# Patient Record
Sex: FEMALE | Race: WHITE | Employment: FULL TIME | URBAN - METROPOLITAN AREA
[De-identification: names, ages, dates, MRNs, and addresses within clinical notes are randomized per-mention and may not be internally consistent; named-entity substitution may affect disease eponyms.]

---

## 2017-07-03 ENCOUNTER — OFFICE VISIT (OUTPATIENT)
Dept: INTERNAL MEDICINE | Age: 27
End: 2017-07-03

## 2017-07-03 VITALS
BODY MASS INDEX: 43.5 KG/M2 | DIASTOLIC BLOOD PRESSURE: 76 MMHG | HEART RATE: 77 BPM | TEMPERATURE: 98.1 F | WEIGHT: 254.8 LBS | RESPIRATION RATE: 16 BRPM | SYSTOLIC BLOOD PRESSURE: 110 MMHG | OXYGEN SATURATION: 98 % | HEIGHT: 64 IN

## 2017-07-03 DIAGNOSIS — E03.9 ACQUIRED HYPOTHYROIDISM: ICD-10-CM

## 2017-07-03 DIAGNOSIS — Z11.4 SCREENING FOR HIV (HUMAN IMMUNODEFICIENCY VIRUS): ICD-10-CM

## 2017-07-03 DIAGNOSIS — E27.1 ADRENAL INSUFFICIENCY (ADDISON'S DISEASE) (HCC): ICD-10-CM

## 2017-07-03 DIAGNOSIS — Z00.00 ROUTINE PHYSICAL EXAMINATION: Primary | ICD-10-CM

## 2017-07-03 DIAGNOSIS — L30.9 DERMATITIS: ICD-10-CM

## 2017-07-03 DIAGNOSIS — Z23 NEED FOR TDAP VACCINATION: ICD-10-CM

## 2017-07-03 LAB
ALBUMIN SERPL-MCNC: 3.8 G/DL (ref 3.9–4.9)
ALP BLD-CCNC: 62 U/L (ref 40–130)
ALT SERPL-CCNC: 28 U/L (ref 0–33)
ANION GAP SERPL CALCULATED.3IONS-SCNC: 17 MEQ/L (ref 7–13)
AST SERPL-CCNC: 19 U/L (ref 0–35)
BILIRUB SERPL-MCNC: 0.3 MG/DL (ref 0–1.2)
BUN BLDV-MCNC: 12 MG/DL (ref 6–20)
CALCIUM SERPL-MCNC: 8.8 MG/DL (ref 8.6–10.2)
CHLORIDE BLD-SCNC: 103 MEQ/L (ref 98–107)
CHOLESTEROL, TOTAL: 178 MG/DL (ref 0–199)
CO2: 18 MEQ/L (ref 22–29)
CORTISOL - AM: 6.4 UG/DL (ref 6.2–19.4)
CREAT SERPL-MCNC: 0.44 MG/DL (ref 0.5–0.9)
GFR AFRICAN AMERICAN: >60
GFR NON-AFRICAN AMERICAN: >60
GLOBULIN: 3.2 G/DL (ref 2.3–3.5)
GLUCOSE BLD-MCNC: 87 MG/DL (ref 74–109)
HCT VFR BLD CALC: 41.7 % (ref 37–47)
HDLC SERPL-MCNC: 33 MG/DL (ref 40–59)
HEMOGLOBIN: 13.1 G/DL (ref 12–16)
LDL CHOLESTEROL CALCULATED: 110 MG/DL (ref 0–129)
MCH RBC QN AUTO: 26.5 PG (ref 27–31.3)
MCHC RBC AUTO-ENTMCNC: 31.5 % (ref 33–37)
MCV RBC AUTO: 83.9 FL (ref 82–100)
PDW BLD-RTO: 14.1 % (ref 11.5–14.5)
PLATELET # BLD: 328 K/UL (ref 130–400)
POTASSIUM SERPL-SCNC: 4.1 MEQ/L (ref 3.5–5.1)
RBC # BLD: 4.96 M/UL (ref 4.2–5.4)
SODIUM BLD-SCNC: 138 MEQ/L (ref 132–144)
TOTAL PROTEIN: 7 G/DL (ref 6.4–8.1)
TRIGL SERPL-MCNC: 173 MG/DL (ref 0–200)
TSH SERPL DL<=0.05 MIU/L-ACNC: 4.82 UIU/ML (ref 0.27–4.2)
WBC # BLD: 12.3 K/UL (ref 4.8–10.8)

## 2017-07-03 PROCEDURE — 90715 TDAP VACCINE 7 YRS/> IM: CPT | Performed by: PHYSICIAN ASSISTANT

## 2017-07-03 PROCEDURE — 96160 PT-FOCUSED HLTH RISK ASSMT: CPT | Performed by: PHYSICIAN ASSISTANT

## 2017-07-03 PROCEDURE — 36415 COLL VENOUS BLD VENIPUNCTURE: CPT | Performed by: PHYSICIAN ASSISTANT

## 2017-07-03 PROCEDURE — 90471 IMMUNIZATION ADMIN: CPT | Performed by: PHYSICIAN ASSISTANT

## 2017-07-03 PROCEDURE — 99203 OFFICE O/P NEW LOW 30 MIN: CPT | Performed by: PHYSICIAN ASSISTANT

## 2017-07-03 RX ORDER — ESCITALOPRAM OXALATE 20 MG/1
1 TABLET ORAL DAILY
Refills: 1 | COMMUNITY
Start: 2017-06-10 | End: 2018-01-24

## 2017-07-03 RX ORDER — QUETIAPINE FUMARATE 100 MG/1
1 TABLET, FILM COATED ORAL NIGHTLY
Refills: 1 | COMMUNITY
Start: 2017-06-10 | End: 2018-01-24

## 2017-07-03 RX ORDER — HYDROXYZINE PAMOATE 25 MG/1
1 CAPSULE ORAL 2 TIMES DAILY
Refills: 1 | COMMUNITY
Start: 2017-05-08 | End: 2020-06-04

## 2017-07-03 ASSESSMENT — ENCOUNTER SYMPTOMS
VOMITING: 0
EYE PAIN: 0
CONSTIPATION: 0
NAUSEA: 1
HEARTBURN: 0
SORE THROAT: 0
COUGH: 0
DIARRHEA: 1
WHEEZING: 0
BLURRED VISION: 0
ABDOMINAL PAIN: 0
BACK PAIN: 0
SHORTNESS OF BREATH: 0

## 2017-07-03 ASSESSMENT — PATIENT HEALTH QUESTIONNAIRE - PHQ9
4. FEELING TIRED OR HAVING LITTLE ENERGY: 3
8. MOVING OR SPEAKING SO SLOWLY THAT OTHER PEOPLE COULD HAVE NOTICED. OR THE OPPOSITE, BEING SO FIGETY OR RESTLESS THAT YOU HAVE BEEN MOVING AROUND A LOT MORE THAN USUAL: 3
SUM OF ALL RESPONSES TO PHQ QUESTIONS 1-9: 22
2. FEELING DOWN, DEPRESSED OR HOPELESS: 3
7. TROUBLE CONCENTRATING ON THINGS, SUCH AS READING THE NEWSPAPER OR WATCHING TELEVISION: 0
9. THOUGHTS THAT YOU WOULD BE BETTER OFF DEAD, OR OF HURTING YOURSELF: 1
5. POOR APPETITE OR OVEREATING: 3
6. FEELING BAD ABOUT YOURSELF - OR THAT YOU ARE A FAILURE OR HAVE LET YOURSELF OR YOUR FAMILY DOWN: 3
10. IF YOU CHECKED OFF ANY PROBLEMS, HOW DIFFICULT HAVE THESE PROBLEMS MADE IT FOR YOU TO DO YOUR WORK, TAKE CARE OF THINGS AT HOME, OR GET ALONG WITH OTHER PEOPLE: 1
1. LITTLE INTEREST OR PLEASURE IN DOING THINGS: 3
3. TROUBLE FALLING OR STAYING ASLEEP: 3
SUM OF ALL RESPONSES TO PHQ9 QUESTIONS 1 & 2: 6

## 2017-07-06 LAB — HIV-1 AND HIV-2 ANTIBODIES: NEGATIVE

## 2017-07-20 ENCOUNTER — TELEPHONE (OUTPATIENT)
Dept: INTERNAL MEDICINE | Age: 27
End: 2017-07-20

## 2017-07-20 DIAGNOSIS — D72.829 LEUKOCYTOSIS, UNSPECIFIED TYPE: ICD-10-CM

## 2017-07-20 DIAGNOSIS — R79.89 ABNORMAL TSH: Primary | ICD-10-CM

## 2017-08-02 DIAGNOSIS — L30.9 DERMATITIS: Primary | ICD-10-CM

## 2017-08-08 ENCOUNTER — NURSE ONLY (OUTPATIENT)
Dept: INTERNAL MEDICINE | Age: 27
End: 2017-08-08

## 2017-08-08 DIAGNOSIS — R79.89 ABNORMAL TSH: ICD-10-CM

## 2017-08-08 DIAGNOSIS — D72.829 LEUKOCYTOSIS, UNSPECIFIED TYPE: ICD-10-CM

## 2017-08-08 DIAGNOSIS — L30.9 DERMATITIS: Primary | ICD-10-CM

## 2017-08-08 LAB
HCT VFR BLD CALC: 39.8 % (ref 37–47)
HEMOGLOBIN: 12.9 G/DL (ref 12–16)
MCH RBC QN AUTO: 26.6 PG (ref 27–31.3)
MCHC RBC AUTO-ENTMCNC: 32.5 % (ref 33–37)
MCV RBC AUTO: 82 FL (ref 82–100)
PDW BLD-RTO: 13.8 % (ref 11.5–14.5)
PLATELET # BLD: 359 K/UL (ref 130–400)
RBC # BLD: 4.85 M/UL (ref 4.2–5.4)
TSH REFLEX: 7.58 UIU/ML (ref 0.27–4.2)
WBC # BLD: 13.9 K/UL (ref 4.8–10.8)

## 2017-08-08 PROCEDURE — 36415 COLL VENOUS BLD VENIPUNCTURE: CPT | Performed by: PHYSICIAN ASSISTANT

## 2017-08-09 DIAGNOSIS — E03.9 ACQUIRED HYPOTHYROIDISM: Primary | ICD-10-CM

## 2017-08-09 LAB — T4 FREE: 1.06 NG/DL (ref 0.93–1.7)

## 2017-08-09 RX ORDER — LEVOTHYROXINE SODIUM 0.03 MG/1
25 TABLET ORAL DAILY
Qty: 30 TABLET | Refills: 3 | Status: SHIPPED | OUTPATIENT
Start: 2017-08-09 | End: 2018-01-24

## 2017-08-13 LAB
2000687N OAK TREE IGE: <0.1 KU/L
ALLERGEN ASPERGILLUS ALTERNATA IGE: <0.1 KU/L
ALLERGEN ASPERGILLUS FUMIGATUS IGE: <0.1 KU/L
ALLERGEN BERMUDA GRASS IGE: <0.1 KU/L
ALLERGEN BIRCH IGE: <0.1 KU/L
ALLERGEN CAT DANDER IGE: <0.1 KU/L
ALLERGEN CLAMS IGE: <0.1 KU/L
ALLERGEN CODFISH IGE: <0.1 KU/L
ALLERGEN COMMON SHORT RAGWEED IGE: <0.1 KU/L
ALLERGEN CORN IGE: <0.1 KU/L
ALLERGEN COTTONWOOD: <0.1 KU/L
ALLERGEN COW MILK IGE: <0.1 KU/L
ALLERGEN DOG DANDER IGE: <0.1 KU/L
ALLERGEN EGG WHITE IGE: <0.1 KU/L
ALLERGEN ELM IGE: <0.1 KU/L
ALLERGEN FUNGI/MOLD M.RACEMOSUS IGE: <0.1 KU/L
ALLERGEN GERMAN COCKROACH IGE: 0.39 KU/L
ALLERGEN HORMODENDRUM HORDEI IGE: <0.1 KU/L
ALLERGEN MAPLE/BOX ELDER IGE: <0.1 KU/L
ALLERGEN MITE DUST FARINAE IGE: <0.1 KU/L
ALLERGEN MITE DUST PTERONYSSINUS IGE: <0.1 KU/L
ALLERGEN MOUNTAIN CEDAR: <0.1 KU/L
ALLERGEN MOUSE EPITHELIA IGE: <0.1 KU/L
ALLERGEN PEANUT (F13) IGE: <0.1 KU/L
ALLERGEN PECAN TREE IGE: <0.1 KU/L
ALLERGEN PENICILLIUM NOTATUM: <0.1 KU/L
ALLERGEN ROUGH PIGWEED (W14) IGE: <0.1 KU/L
ALLERGEN RUSSIAN THISTLE IGE: <0.1 KU/L
ALLERGEN SCALLOP IGE: <0.1 KU/L
ALLERGEN SEE NOTE: NORMAL
ALLERGEN SHEEP SORREL (W18) IGE: <0.1 KU/L
ALLERGEN SHRIMP IGE: 0.2 KU/L
ALLERGEN SOYBEAN IGE: <0.1 KU/L
ALLERGEN TIMOTHY GRASS: <0.1 KU/L
ALLERGEN TREE SYCAMORE: <0.1 KU/L
ALLERGEN WALNUT IGE: <0.1 KU/L
ALLERGEN WALNUT TREE IGE: <0.1 KU/L
ALLERGEN WHEAT IGE: <0.1 KU/L
ALLERGEN WHITE MULBERRY TREE, IGE: <0.1 KU/L
ALLERGEN, TREE, WHITE ASH IGE: <0.1 KU/L
IGE: 23 KU/L

## 2017-11-08 ENCOUNTER — NURSE ONLY (OUTPATIENT)
Dept: INTERNAL MEDICINE | Age: 27
End: 2017-11-08

## 2017-11-08 DIAGNOSIS — E03.9 ACQUIRED HYPOTHYROIDISM: Primary | ICD-10-CM

## 2017-11-08 LAB — TSH SERPL DL<=0.05 MIU/L-ACNC: 7.15 UIU/ML (ref 0.27–4.2)

## 2017-11-10 DIAGNOSIS — E03.9 ACQUIRED HYPOTHYROIDISM: Primary | ICD-10-CM

## 2017-11-10 RX ORDER — LEVOTHYROXINE SODIUM 0.05 MG/1
50 TABLET ORAL DAILY
Qty: 30 TABLET | Refills: 3 | Status: SHIPPED | OUTPATIENT
Start: 2017-11-10 | End: 2018-09-08 | Stop reason: SDUPTHER

## 2018-01-24 ENCOUNTER — OFFICE VISIT (OUTPATIENT)
Dept: INTERNAL MEDICINE CLINIC | Age: 28
End: 2018-01-24
Payer: COMMERCIAL

## 2018-01-24 VITALS
HEIGHT: 64 IN | HEART RATE: 83 BPM | OXYGEN SATURATION: 97 % | DIASTOLIC BLOOD PRESSURE: 80 MMHG | TEMPERATURE: 98.6 F | WEIGHT: 277.2 LBS | SYSTOLIC BLOOD PRESSURE: 130 MMHG | BODY MASS INDEX: 47.33 KG/M2

## 2018-01-24 DIAGNOSIS — F41.8 ANXIETY WITH DEPRESSION: ICD-10-CM

## 2018-01-24 DIAGNOSIS — J01.00 ACUTE NON-RECURRENT MAXILLARY SINUSITIS: Primary | ICD-10-CM

## 2018-01-24 DIAGNOSIS — G47.00 INSOMNIA, UNSPECIFIED TYPE: ICD-10-CM

## 2018-01-24 PROCEDURE — G8427 DOCREV CUR MEDS BY ELIG CLIN: HCPCS | Performed by: PHYSICIAN ASSISTANT

## 2018-01-24 PROCEDURE — G8484 FLU IMMUNIZE NO ADMIN: HCPCS | Performed by: PHYSICIAN ASSISTANT

## 2018-01-24 PROCEDURE — G8417 CALC BMI ABV UP PARAM F/U: HCPCS | Performed by: PHYSICIAN ASSISTANT

## 2018-01-24 PROCEDURE — 1036F TOBACCO NON-USER: CPT | Performed by: PHYSICIAN ASSISTANT

## 2018-01-24 PROCEDURE — 99213 OFFICE O/P EST LOW 20 MIN: CPT | Performed by: PHYSICIAN ASSISTANT

## 2018-01-24 RX ORDER — BROMPHENIRAMINE MALEATE, PSEUDOEPHEDRINE HYDROCHLORIDE, AND DEXTROMETHORPHAN HYDROBROMIDE 2; 30; 10 MG/5ML; MG/5ML; MG/5ML
SYRUP ORAL
Refills: 0 | COMMUNITY
Start: 2018-01-21 | End: 2018-05-31 | Stop reason: ALTCHOICE

## 2018-01-24 RX ORDER — TRAZODONE HYDROCHLORIDE 150 MG/1
150 TABLET ORAL NIGHTLY
Qty: 30 TABLET | Refills: 3 | Status: SHIPPED | OUTPATIENT
Start: 2018-01-24 | End: 2020-06-04

## 2018-01-24 RX ORDER — HYDROXYZINE PAMOATE 25 MG/1
25 CAPSULE ORAL 3 TIMES DAILY PRN
Qty: 90 CAPSULE | Refills: 3 | Status: SHIPPED | OUTPATIENT
Start: 2018-01-24 | End: 2018-02-23

## 2018-01-24 RX ORDER — ESCITALOPRAM OXALATE 20 MG/1
TABLET ORAL
Qty: 45 TABLET | Refills: 3 | Status: SHIPPED | OUTPATIENT
Start: 2018-01-24 | End: 2020-06-04

## 2018-01-24 RX ORDER — CEFDINIR 300 MG/1
CAPSULE ORAL
Refills: 0 | COMMUNITY
Start: 2018-01-21 | End: 2018-05-31 | Stop reason: ALTCHOICE

## 2018-01-24 RX ORDER — FLUTICASONE PROPIONATE 50 MCG
1 SPRAY, SUSPENSION (ML) NASAL DAILY
Qty: 1 BOTTLE | Refills: 3 | Status: SHIPPED | OUTPATIENT
Start: 2018-01-24 | End: 2018-05-31

## 2018-01-24 ASSESSMENT — ENCOUNTER SYMPTOMS
DIARRHEA: 0
SORE THROAT: 0
COUGH: 1
DOUBLE VISION: 0
SINUS PAIN: 0
SHORTNESS OF BREATH: 1
VOMITING: 0
ABDOMINAL PAIN: 0
BLURRED VISION: 0
BACK PAIN: 0
WHEEZING: 0

## 2018-01-24 NOTE — PROGRESS NOTES
Subjective  Char Ashley, 28 y.o. female presents today with:  Chief Complaint   Patient presents with    Medication Check     pt is here to discuss medication for anxiety and depression. pt states neither are working well and [facility] ended her treatment on them     URI     pt was in an urgent care  and was told she had bronchitis. medications she was put on not helping. pt is also on antibiotic. HPI      Review of Systems   Constitutional: Negative for chills, fever and weight loss. HENT: Negative for congestion, sinus pain and sore throat. Eyes: Negative for blurred vision and double vision. Respiratory: Positive for cough and shortness of breath. Negative for wheezing. Cardiovascular: Positive for palpitations. Negative for chest pain. Gastrointestinal: Negative for abdominal pain, diarrhea and vomiting. Genitourinary: Negative for hematuria. Musculoskeletal: Positive for neck pain. Negative for back pain. Skin: Negative for itching and rash. Neurological: Positive for dizziness and headaches. Psychiatric/Behavioral: Negative for memory loss. The patient does not have insomnia. Past Medical History:   Diagnosis Date    Adrenal insufficiency (Winn's disease) ([facility] [address]     Anti-phospholipid antibody syndrome ([facility]     Anxiety 2017    Depression 2017    Hypothyroidism      Past Surgical History:   Procedure Laterality Date     SECTION      GALLBLADDER SURGERY Bilateral 2015    OVARY REMOVAL Left      Social History     Social History    Marital status:      Spouse name: N/A    Number of children: N/A    Years of education: N/A     Occupational History    Not on file.      Social History Main Topics    Smoking status: Former Smoker     Types: Cigarettes     Quit date: 7/3/2015    Smokeless tobacco: Never Used    Alcohol use No    Drug use: No    Sexual activity: Yes     Partners: Male, Female     Other Topics Concern    Not on maxillary sinus   Eyes: Conjunctivae and EOM are normal. Pupils are equal, round, and reactive to light. Neck: Normal range of motion. Neck supple. Cardiovascular: Normal rate, regular rhythm and normal heart sounds. Pulmonary/Chest: Effort normal and breath sounds normal.   Abdominal: Soft. Bowel sounds are normal.   Musculoskeletal: Normal range of motion. Neurological: She is alert and oriented to person, place, and time. Skin: Skin is warm and dry. Psychiatric: Affect normal.            Assessment & Plan   1. Acute non-recurrent maxillary sinusitis     2. Anxiety with depression  Amb External Referral To Psychiatry   3. Insomnia, unspecified type  Amb External Referral To Psychiatry         Orders Placed This Encounter   Procedures    Amb External Referral To Psychiatry     Referral Priority:   Routine     Referral Type:   Consult for Advice and Opinion     Requested Specialty:   Behavioral Health     Number of Visits Requested:   1     Orders Placed This Encounter   Medications    escitalopram (LEXAPRO) 20 MG tablet     Sig: Take 1 1/2 tablets daily by  mouth     Dispense:  45 tablet     Refill:  3    hydrOXYzine (VISTARIL) 25 MG capsule     Sig: Take 1 capsule by mouth 3 times daily as needed for Anxiety     Dispense:  90 capsule     Refill:  3    traZODone (DESYREL) 150 MG tablet     Sig: Take 1 tablet by mouth nightly     Dispense:  30 tablet     Refill:  3    fluticasone (FLONASE) 50 MCG/ACT nasal spray     Si spray by Nasal route daily     Dispense:  1 Bottle     Refill:  3     Medications Discontinued During This Encounter   Medication Reason    escitalopram (LEXAPRO) 20 MG tablet     hydrocortisone 2.5 % cream     levothyroxine (SYNTHROID) 25 MCG tablet     QUEtiapine (SEROQUEL) 100 MG tablet      Return in about 3 months (around 2018).     Lyudmila Andrews PA-C

## 2018-05-29 ENCOUNTER — TELEPHONE (OUTPATIENT)
Dept: INTERNAL MEDICINE CLINIC | Age: 28
End: 2018-05-29

## 2018-05-31 ENCOUNTER — OFFICE VISIT (OUTPATIENT)
Dept: INTERNAL MEDICINE CLINIC | Age: 28
End: 2018-05-31
Payer: COMMERCIAL

## 2018-05-31 VITALS
DIASTOLIC BLOOD PRESSURE: 70 MMHG | HEIGHT: 64 IN | RESPIRATION RATE: 16 BRPM | SYSTOLIC BLOOD PRESSURE: 108 MMHG | TEMPERATURE: 98.6 F | HEART RATE: 74 BPM | WEIGHT: 277.4 LBS | BODY MASS INDEX: 47.36 KG/M2 | OXYGEN SATURATION: 98 %

## 2018-05-31 DIAGNOSIS — F41.8 ANXIETY WITH DEPRESSION: Primary | ICD-10-CM

## 2018-05-31 PROCEDURE — 99214 OFFICE O/P EST MOD 30 MIN: CPT | Performed by: PHYSICIAN ASSISTANT

## 2018-05-31 RX ORDER — SERTRALINE HYDROCHLORIDE 25 MG/1
25 TABLET, FILM COATED ORAL DAILY
Qty: 30 TABLET | Refills: 3 | Status: SHIPPED | OUTPATIENT
Start: 2018-05-31 | End: 2020-06-04

## 2018-05-31 RX ORDER — MECLIZINE HCL 12.5 MG/1
12.5 TABLET ORAL 3 TIMES DAILY PRN
COMMUNITY
End: 2020-06-04

## 2018-05-31 ASSESSMENT — ENCOUNTER SYMPTOMS
WHEEZING: 0
CONSTIPATION: 0
HEARTBURN: 0
DIARRHEA: 0
COUGH: 1
SHORTNESS OF BREATH: 0
ABDOMINAL PAIN: 0
NAUSEA: 0
SORE THROAT: 0
BLURRED VISION: 0
BACK PAIN: 0
VOMITING: 0

## 2018-09-10 DIAGNOSIS — E03.9 ACQUIRED HYPOTHYROIDISM: Primary | ICD-10-CM

## 2020-03-20 ENCOUNTER — VIRTUAL VISIT (OUTPATIENT)
Dept: FAMILY MEDICINE CLINIC | Age: 30
End: 2020-03-20
Payer: COMMERCIAL

## 2020-03-20 PROCEDURE — 99442 PR PHYS/QHP TELEPHONE EVALUATION 11-20 MIN: CPT | Performed by: PHYSICIAN ASSISTANT

## 2020-03-20 RX ORDER — SERTRALINE HYDROCHLORIDE 25 MG/1
25 TABLET, FILM COATED ORAL DAILY
Qty: 90 TABLET | Refills: 1 | Status: SHIPPED | OUTPATIENT
Start: 2020-03-20 | End: 2020-08-31

## 2020-03-20 RX ORDER — OMEPRAZOLE 20 MG/1
20 CAPSULE, DELAYED RELEASE ORAL
Qty: 60 CAPSULE | Refills: 5 | Status: SHIPPED | OUTPATIENT
Start: 2020-03-20 | End: 2020-04-23 | Stop reason: SDUPTHER

## 2020-03-20 ASSESSMENT — ENCOUNTER SYMPTOMS: ABDOMINAL PAIN: 1

## 2020-03-20 NOTE — PROGRESS NOTES
3/20/2020     Mabel العراقي (:  1990) is a 26 y.o. female, here for evaluation of the following medical concerns:    HPI  Telephone encounter for complaint of anxiety depression heartburn and hypothyroidism  Patient has not not been seen in office in 2 years due to loss of insurance  States she is really suffering greatly with her anxiety particularly in social situations her depression has been \"up and down' denies suicidal ideation however in 2018 she attempted suicide by taking an overdose of sleeping pills  She has been receiving psychiatric care through the [facility] but no one recently  On zoloft most recently but does not recall her response  Complaining of heartburn indigestion particularly with caffeine and spicy foods symptoms worsened at bedtime has remote history of peptic ulcer  Has been off of synthroid since   Review of Systems   Gastrointestinal: Positive for abdominal pain. Heartburn   Psychiatric/Behavioral: Positive for dysphoric mood. The patient is nervous/anxious. All other systems reviewed and are negative. Prior to Visit Medications    Medication Sig Taking?  Authorizing Provider   levothyroxine (SYNTHROID) 50 MCG tablet TAKE ONE TABLET BY MOUTH EVERY DAY  Lyudmila Andrews PA-C   etonogestrel (NEXPLANON) 68 MG implant Inject 68 mg into the skin  Historical Provider, MD   meclizine (ANTIVERT) 12.5 MG tablet Take 12.5 mg by mouth 3 times daily as needed  Historical Provider, MD   sertraline (ZOLOFT) 25 MG tablet Take 1 tablet by mouth daily  Lyudmila Andrews PA-C   VENTOLIN  (90 Base) MCG/ACT inhaler INHALE 2 PUFFS EVERY 4-6 HOURS AS NEEDED  Historical Provider, MD   escitalopram (LEXAPRO) 20 MG tablet Take 1 1/2 tablets daily by  mouth  Lyudmila Andrews PA-C   traZODone (DESYREL) 150 MG tablet Take 1 tablet by mouth nightly  Lyudmila Andrews PA-C   hydrOXYzine (VISTARIL) 25 MG capsule Take 1 capsule by mouth 2 times daily Historical Provider, MD        Social History     Tobacco Use    Smoking status: Former Smoker     Types: Cigarettes     Last attempt to quit: 7/3/2015     Years since quittin.7    Smokeless tobacco: Never Used   Substance Use Topics    Alcohol use: No        There were no vitals filed for this visit. Estimated body mass index is 47.62 kg/m² as calculated from the following:    Height as of 18: 5' 4\" (1.626 m). Weight as of 18: 277 lb 6.4 oz (125.8 kg). Physical Exam  Neurological:      Mental Status: She is oriented to person, place, and time. Assessment & Plan    Diagnosis Orders   1. Anxiety with depression  Vitamin D 25 Hydroxy    sertraline (ZOLOFT) 25 MG tablet   2. Gastroesophageal reflux disease, esophagitis presence not specified  omeprazole (PRILOSEC) 20 MG delayed release capsule   3. Acquired hypothyroidism  TSH with Reflex   4. Routine medical exam  Comprehensive Metabolic Panel    CBC With Auto Differential         Orders Placed This Encounter   Procedures    Comprehensive Metabolic Panel     Standing Status:   Future     Standing Expiration Date:   3/20/2021    CBC With Auto Differential     Standing Status:   Future     Standing Expiration Date:   3/20/2021    TSH with Reflex     Standing Status:   Future     Standing Expiration Date:   3/20/2021    Vitamin D 25 Hydroxy     Standing Status:   Future     Standing Expiration Date:   3/20/2021     Orders Placed This Encounter   Medications    omeprazole (PRILOSEC) 20 MG delayed release capsule     Sig: Take 1 capsule by mouth 2 times daily (before meals)     Dispense:  60 capsule     Refill:  5    sertraline (ZOLOFT) 25 MG tablet     Sig: Take 1 tablet by mouth daily     Dispense:  90 tablet     Refill:  1     There are no discontinued medications. No follow-ups on file. An electronic signature was used to authenticate this note.     --Lyudmila Andrews PA-C on 3/20/2020 at 8:54 AM

## 2020-03-21 DIAGNOSIS — F41.8 ANXIETY WITH DEPRESSION: ICD-10-CM

## 2020-03-21 DIAGNOSIS — Z00.00 ROUTINE MEDICAL EXAM: ICD-10-CM

## 2020-03-21 DIAGNOSIS — E03.9 ACQUIRED HYPOTHYROIDISM: ICD-10-CM

## 2020-03-21 LAB
ALBUMIN SERPL-MCNC: 4.3 G/DL (ref 3.5–4.6)
ALP BLD-CCNC: 72 U/L (ref 40–130)
ALT SERPL-CCNC: 34 U/L (ref 0–33)
ANION GAP SERPL CALCULATED.3IONS-SCNC: 16 MEQ/L (ref 9–15)
AST SERPL-CCNC: 21 U/L (ref 0–35)
BASOPHILS ABSOLUTE: 0.1 K/UL (ref 0–0.2)
BASOPHILS RELATIVE PERCENT: 0.9 %
BILIRUB SERPL-MCNC: 0.5 MG/DL (ref 0.2–0.7)
BUN BLDV-MCNC: 10 MG/DL (ref 6–20)
CALCIUM SERPL-MCNC: 9.5 MG/DL (ref 8.5–9.9)
CHLORIDE BLD-SCNC: 98 MEQ/L (ref 95–107)
CO2: 25 MEQ/L (ref 20–31)
CREAT SERPL-MCNC: 0.64 MG/DL (ref 0.5–0.9)
EOSINOPHILS ABSOLUTE: 0.2 K/UL (ref 0–0.7)
EOSINOPHILS RELATIVE PERCENT: 1.7 %
GFR AFRICAN AMERICAN: >60
GFR NON-AFRICAN AMERICAN: >60
GLOBULIN: 4 G/DL (ref 2.3–3.5)
GLUCOSE BLD-MCNC: 85 MG/DL (ref 70–99)
HCT VFR BLD CALC: 46.7 % (ref 37–47)
HEMOGLOBIN: 15 G/DL (ref 12–16)
LYMPHOCYTES ABSOLUTE: 3.3 K/UL (ref 1–4.8)
LYMPHOCYTES RELATIVE PERCENT: 23.4 %
MCH RBC QN AUTO: 27.8 PG (ref 27–31.3)
MCHC RBC AUTO-ENTMCNC: 32.2 % (ref 33–37)
MCV RBC AUTO: 86.3 FL (ref 82–100)
MONOCYTES ABSOLUTE: 0.7 K/UL (ref 0.2–0.8)
MONOCYTES RELATIVE PERCENT: 4.9 %
NEUTROPHILS ABSOLUTE: 9.7 K/UL (ref 1.4–6.5)
NEUTROPHILS RELATIVE PERCENT: 69.1 %
PDW BLD-RTO: 13.4 % (ref 11.5–14.5)
PLATELET # BLD: 425 K/UL (ref 130–400)
POTASSIUM SERPL-SCNC: 4.2 MEQ/L (ref 3.4–4.9)
RBC # BLD: 5.4 M/UL (ref 4.2–5.4)
SODIUM BLD-SCNC: 139 MEQ/L (ref 135–144)
TOTAL PROTEIN: 8.3 G/DL (ref 6.3–8)
TSH REFLEX: 3.45 UIU/ML (ref 0.44–3.86)
VITAMIN D 25-HYDROXY: 18.4 NG/ML (ref 30–100)
WBC # BLD: 14.1 K/UL (ref 4.8–10.8)

## 2020-03-23 RX ORDER — METHOCARBAMOL 750 MG/1
50000 TABLET ORAL WEEKLY
Qty: 4 CAPSULE | Refills: 3 | Status: SHIPPED | OUTPATIENT
Start: 2020-03-23 | End: 2020-04-23 | Stop reason: SDUPTHER

## 2020-04-01 ENCOUNTER — PATIENT MESSAGE (OUTPATIENT)
Dept: FAMILY MEDICINE CLINIC | Age: 30
End: 2020-04-01

## 2020-04-01 RX ORDER — CITALOPRAM 20 MG/1
20 TABLET ORAL DAILY
Qty: 30 TABLET | Refills: 5 | Status: SHIPPED | OUTPATIENT
Start: 2020-04-01 | End: 2020-04-23 | Stop reason: SDUPTHER

## 2020-04-01 NOTE — TELEPHONE ENCOUNTER
From: Claudell Grade  To: Lyudmila Andrews PA-C  Sent: 4/1/2020 11:30 AM EDT  Subject: Prescription Question    I was wondering if it was possible to switch from the zoloft to another medication? Since starting it I have started experiencing some fairly severe nausea which has resulted in vomiting a few times. I have no fever, or any cold/flu symptoms other than a stuffy nose and headache so I think its medication related.

## 2020-04-01 NOTE — TELEPHONE ENCOUNTER
rx changed to celexa which she can start tomorrow if she has taken the zoloft today. let me know how she does

## 2020-04-21 ENCOUNTER — PATIENT MESSAGE (OUTPATIENT)
Dept: FAMILY MEDICINE CLINIC | Age: 30
End: 2020-04-21

## 2020-04-23 RX ORDER — CITALOPRAM 20 MG/1
20 TABLET ORAL DAILY
Qty: 90 TABLET | Refills: 1 | Status: SHIPPED | OUTPATIENT
Start: 2020-04-23 | End: 2020-06-05 | Stop reason: ALTCHOICE

## 2020-04-23 RX ORDER — OMEPRAZOLE 20 MG/1
20 CAPSULE, DELAYED RELEASE ORAL
Qty: 180 CAPSULE | Refills: 1 | Status: SHIPPED | OUTPATIENT
Start: 2020-04-23 | End: 2020-08-31

## 2020-04-23 RX ORDER — METHOCARBAMOL 750 MG/1
50000 TABLET ORAL WEEKLY
Qty: 12 CAPSULE | Refills: 0 | Status: SHIPPED | OUTPATIENT
Start: 2020-04-23 | End: 2020-09-24

## 2020-04-23 NOTE — TELEPHONE ENCOUNTER
From: Thania Romero  Sent: 4/23/2020 2:04 PM EDT  To: Nakia Diaz  Clinical Staff  Subject: RE: Prescription Question    Hello I take  Vitamin D 50,000 units once a week  Celexa 20 mg once a day  Omeprazole DR 20 mg twice a day  [facility] on [address]     ----- Message -----  From: Sandra Jace  Sent: 4/22/20 11:54 AM  To: Thania Romero  Subject: RE: Prescription Question    She would like you to please send exactly what you need including dosing and directions also pharmacy you need them to go to.      ----- Message -----   From:Suad Willson   Sent:4/21/2020 6:09 PM EDT   To:Lyudmila Andrews PA-C   Subject:Prescription Question    Hello, I have received notification from my insurance provider that my maintenance medications have to be a 90 day supply to be covered. Is it possible to get a 90 day prescription for my maintenance medications?       - - - DISCLAIMER - - -  [email or web address][This message may contain formatting that cannot be shown on this site.]

## 2020-04-30 ENCOUNTER — VIRTUAL VISIT (OUTPATIENT)
Dept: FAMILY MEDICINE CLINIC | Age: 30
End: 2020-04-30
Payer: COMMERCIAL

## 2020-04-30 PROCEDURE — G0444 DEPRESSION SCREEN ANNUAL: HCPCS | Performed by: PHYSICIAN ASSISTANT

## 2020-04-30 PROCEDURE — 99442 PR PHYS/QHP TELEPHONE EVALUATION 11-20 MIN: CPT | Performed by: PHYSICIAN ASSISTANT

## 2020-04-30 RX ORDER — CITALOPRAM 40 MG/1
40 TABLET ORAL DAILY
Qty: 30 TABLET | Refills: 5 | Status: SHIPPED | OUTPATIENT
Start: 2020-04-30 | End: 2020-08-31

## 2020-04-30 RX ORDER — HYDROXYZINE PAMOATE 25 MG/1
25 CAPSULE ORAL 4 TIMES DAILY PRN
Qty: 120 CAPSULE | Refills: 5 | Status: SHIPPED | OUTPATIENT
Start: 2020-04-30 | End: 2020-05-30

## 2020-04-30 ASSESSMENT — COLUMBIA-SUICIDE SEVERITY RATING SCALE - C-SSRS
3. HAVE YOU BEEN THINKING ABOUT HOW YOU MIGHT KILL YOURSELF?: NO
2. HAVE YOU ACTUALLY HAD ANY THOUGHTS OF KILLING YOURSELF?: YES
5. HAVE YOU STARTED TO WORK OUT OR WORKED OUT THE DETAILS OF HOW TO KILL YOURSELF? DO YOU INTEND TO CARRY OUT THIS PLAN?: NO
1. WITHIN THE PAST MONTH, HAVE YOU WISHED YOU WERE DEAD OR WISHED YOU COULD GO TO SLEEP AND NOT WAKE UP?: NO
4. HAVE YOU HAD THESE THOUGHTS AND HAD SOME INTENTION OF ACTING ON THEM?: NO
6. HAVE YOU EVER DONE ANYTHING, STARTED TO DO ANYTHING, OR PREPARED TO DO ANYTHING TO END YOUR LIFE?: NO

## 2020-04-30 ASSESSMENT — PATIENT HEALTH QUESTIONNAIRE - PHQ9
2. FEELING DOWN, DEPRESSED OR HOPELESS: 1
10. IF YOU CHECKED OFF ANY PROBLEMS, HOW DIFFICULT HAVE THESE PROBLEMS MADE IT FOR YOU TO DO YOUR WORK, TAKE CARE OF THINGS AT HOME, OR GET ALONG WITH OTHER PEOPLE: 1
5. POOR APPETITE OR OVEREATING: 1
7. TROUBLE CONCENTRATING ON THINGS, SUCH AS READING THE NEWSPAPER OR WATCHING TELEVISION: 2
SUM OF ALL RESPONSES TO PHQ QUESTIONS 1-9: 16
SUM OF ALL RESPONSES TO PHQ9 QUESTIONS 1 & 2: 3
9. THOUGHTS THAT YOU WOULD BE BETTER OFF DEAD, OR OF HURTING YOURSELF: 1
6. FEELING BAD ABOUT YOURSELF - OR THAT YOU ARE A FAILURE OR HAVE LET YOURSELF OR YOUR FAMILY DOWN: 3
8. MOVING OR SPEAKING SO SLOWLY THAT OTHER PEOPLE COULD HAVE NOTICED. OR THE OPPOSITE, BEING SO FIGETY OR RESTLESS THAT YOU HAVE BEEN MOVING AROUND A LOT MORE THAN USUAL: 1
SUM OF ALL RESPONSES TO PHQ QUESTIONS 1-9: 16
4. FEELING TIRED OR HAVING LITTLE ENERGY: 3
3. TROUBLE FALLING OR STAYING ASLEEP: 2
1. LITTLE INTEREST OR PLEASURE IN DOING THINGS: 2

## 2020-04-30 NOTE — PROGRESS NOTES
2020     Garry Dao (:  1990) is a 26 y.o. female, here for evaluation of the following medical concerns:  Anxiety and depression  HPI   Telemedicine telephone visit d.t to concern for exposure to Covid 19 (coronavirus). Patient is aware this is a billable visit. This visit is a  follow-up to the  initiation of Celexa for anxiety and depression. she feels it has helped better than Zoloft, however, she is still having some moments where she wants to disengage from social activity - moments of severe anxiety. she has thoughts of suicide but states she would never take any action. she is having interrupted sleepi s she  able to fall asleep but is unable to remain asleep. c/o racing thoughts    Review of Systems   Psychiatric/Behavioral: Positive for dysphoric mood, sleep disturbance and suicidal ideas. The patient is nervous/anxious. All other systems reviewed and are negative. Assessment & Plan    Diagnosis Orders   1.  Anxiety and depression  Ambulatory referral to Psychology    Ambulatory referral to Psychiatry         Orders Placed This Encounter   Procedures    Ambulatory referral to Psychology     Referral Priority:   Routine     Referral Type:   Psychiatric     Referral Reason:   Specialty Services Required     Referred to Provider:   Pati High PSYD     Requested Specialty:   Psychology     Number of Visits Requested:   1    Ambulatory referral to Psychiatry     Referral Priority:   Routine     Referral Type:   Psychiatric     Referral Reason:   Specialty Services Required     Referred to Provider:   Katrina Portillo MD     Requested Specialty:   Psychiatry     Number of Visits Requested:   1     Orders Placed This Encounter   Medications    citalopram (CELEXA) 40 MG tablet     Sig: Take 1 tablet by mouth daily     Dispense:  30 tablet     Refill:  5    hydrOXYzine (VISTARIL) 25 MG capsule     Sig: Take 1 capsule by mouth 4 times daily as needed for Anxiety Dispense:  120 capsule     Refill:  5     There are no discontinued medications. Return in about 6 weeks (around 2020) for follow up on medicaiton, follow up on response to treatment. Prior to Visit Medications    Medication Sig Taking?  Authorizing Provider   citalopram (CELEXA) 40 MG tablet Take 1 tablet by mouth daily Yes Lyudmila Andrews PA-C   hydrOXYzine (VISTARIL) 25 MG capsule Take 1 capsule by mouth 4 times daily as needed for Anxiety Yes Lyudmila Andrews PA-C   citalopram (CELEXA) 20 MG tablet Take 1 tablet by mouth daily  Lyudmila Andrews PA-C   omeprazole (PRILOSEC) 20 MG delayed release capsule Take 1 capsule by mouth 2 times daily (before meals)  Lyudmila Andrews PA-C   vitamin D (D3-50) 03041 UNIT CAPS Take 1 capsule by mouth once a week  Lyudmila Andrews PA-C   sertraline (ZOLOFT) 25 MG tablet Take 1 tablet by mouth daily  Lyudmila Andrews PA-C   levothyroxine (SYNTHROID) 50 MCG tablet TAKE ONE TABLET BY MOUTH EVERY DAY  Lyudmila Andrews PA-C   etonogestrel (NEXPLANON) 68 MG implant Inject 68 mg into the skin  Historical Provider, MD   meclizine (ANTIVERT) 12.5 MG tablet Take 12.5 mg by mouth 3 times daily as needed  Historical Provider, MD   sertraline (ZOLOFT) 25 MG tablet Take 1 tablet by mouth daily  Lyudmila Andrews PA-C   VENTOLIN  (90 Base) MCG/ACT inhaler INHALE 2 PUFFS EVERY 4-6 HOURS AS NEEDED  Historical Provider, MD   escitalopram (LEXAPRO) 20 MG tablet Take 1 1/2 tablets daily by  mouth  Lyudmila Andrews PA-C   traZODone (DESYREL) 150 MG tablet Take 1 tablet by mouth nightly  Lyudmila Andrews PA-C   hydrOXYzine (VISTARIL) 25 MG capsule Take 1 capsule by mouth 2 times daily  Historical Provider, MD        Social History     Tobacco Use    Smoking status: Former Smoker     Types: Cigarettes     Last attempt to quit: 7/3/2015     Years since quittin.8    Smokeless tobacco: Never Used   Substance Use Topics   

## 2020-06-04 PROBLEM — F39 MOOD DISORDER (HCC): Status: ACTIVE | Noted: 2018-04-03

## 2020-06-04 PROBLEM — H53.8 OTHER VISUAL DISTURBANCES: Status: ACTIVE | Noted: 2018-04-03

## 2020-06-04 PROBLEM — E66.01 MORBID (SEVERE) OBESITY DUE TO EXCESS CALORIES (HCC): Status: ACTIVE | Noted: 2018-10-13

## 2020-06-04 PROBLEM — E03.9 HYPOTHYROIDISM, UNSPECIFIED: Status: ACTIVE | Noted: 2018-10-13

## 2020-06-04 PROBLEM — Z87.891 PERSONAL HISTORY OF NICOTINE DEPENDENCE: Status: ACTIVE | Noted: 2018-10-13

## 2020-06-04 PROBLEM — F41.9 ANXIETY: Status: ACTIVE | Noted: 2018-10-13

## 2020-06-04 PROBLEM — Z88.0 ALLERGY STATUS TO PENICILLIN: Status: ACTIVE | Noted: 2018-10-13

## 2020-06-04 PROBLEM — Z88.8 ALLERGY STATUS TO OTHER DRUGS, MEDICAMENTS AND BIOLOGICAL SUBSTANCES STATUS: Status: ACTIVE | Noted: 2018-10-13

## 2020-06-04 PROBLEM — D68.61 ANTIPHOSPHOLIPID SYNDROME (HCC): Status: ACTIVE | Noted: 2018-10-13

## 2020-06-04 PROBLEM — D75.81 MYELOFIBROSIS (HCC): Status: ACTIVE | Noted: 2018-10-13

## 2020-06-05 ENCOUNTER — OFFICE VISIT (OUTPATIENT)
Dept: OBGYN CLINIC | Age: 30
End: 2020-06-05
Payer: COMMERCIAL

## 2020-06-05 VITALS
DIASTOLIC BLOOD PRESSURE: 80 MMHG | WEIGHT: 270 LBS | BODY MASS INDEX: 46.35 KG/M2 | HEART RATE: 70 BPM | SYSTOLIC BLOOD PRESSURE: 120 MMHG

## 2020-06-05 PROCEDURE — 11982 REMOVE DRUG IMPLANT DEVICE: CPT | Performed by: OBSTETRICS & GYNECOLOGY

## 2020-06-24 ENCOUNTER — OFFICE VISIT (OUTPATIENT)
Dept: OBGYN CLINIC | Age: 30
End: 2020-06-24
Payer: COMMERCIAL

## 2020-06-24 VITALS
HEIGHT: 64 IN | DIASTOLIC BLOOD PRESSURE: 70 MMHG | WEIGHT: 266 LBS | BODY MASS INDEX: 45.41 KG/M2 | SYSTOLIC BLOOD PRESSURE: 110 MMHG

## 2020-06-24 DIAGNOSIS — Z01.419 WOMEN'S ANNUAL ROUTINE GYNECOLOGICAL EXAMINATION: ICD-10-CM

## 2020-06-24 DIAGNOSIS — N91.1 AMENORRHEA, SECONDARY: ICD-10-CM

## 2020-06-24 DIAGNOSIS — Z11.51 SCREENING FOR HPV (HUMAN PAPILLOMAVIRUS): ICD-10-CM

## 2020-06-24 LAB
ALBUMIN SERPL-MCNC: 4.4 G/DL (ref 3.5–4.6)
ALP BLD-CCNC: 60 U/L (ref 40–130)
ALT SERPL-CCNC: 45 U/L (ref 0–33)
ANION GAP SERPL CALCULATED.3IONS-SCNC: 13 MEQ/L (ref 9–15)
AST SERPL-CCNC: 25 U/L (ref 0–35)
BILIRUB SERPL-MCNC: 0.3 MG/DL (ref 0.2–0.7)
BUN BLDV-MCNC: 9 MG/DL (ref 6–20)
CALCIUM SERPL-MCNC: 9.7 MG/DL (ref 8.5–9.9)
CHLORIDE BLD-SCNC: 102 MEQ/L (ref 95–107)
CO2: 24 MEQ/L (ref 20–31)
CREAT SERPL-MCNC: 0.56 MG/DL (ref 0.5–0.9)
GFR AFRICAN AMERICAN: >60
GFR NON-AFRICAN AMERICAN: >60
GLOBULIN: 3.5 G/DL (ref 2.3–3.5)
GLUCOSE BLD-MCNC: 91 MG/DL (ref 70–99)
INSULIN: 38.2 UIU/ML (ref 2.6–24.9)
POTASSIUM SERPL-SCNC: 4.5 MEQ/L (ref 3.4–4.9)
SODIUM BLD-SCNC: 139 MEQ/L (ref 135–144)
T4 FREE: 1.18 NG/DL (ref 0.84–1.68)
TOTAL PROTEIN: 7.9 G/DL (ref 6.3–8)
TSH SERPL DL<=0.05 MIU/L-ACNC: 4.03 UIU/ML (ref 0.44–3.86)

## 2020-06-24 PROCEDURE — 99385 PREV VISIT NEW AGE 18-39: CPT | Performed by: OBSTETRICS & GYNECOLOGY

## 2020-06-24 ASSESSMENT — ENCOUNTER SYMPTOMS
BLOOD IN STOOL: 0
ABDOMINAL PAIN: 0
DIARRHEA: 0
SORE THROAT: 0
SHORTNESS OF BREATH: 0
WHEEZING: 0
ABDOMINAL DISTENTION: 0
COUGH: 0
NAUSEA: 0
CONSTIPATION: 0
VOMITING: 0

## 2020-06-24 NOTE — PROGRESS NOTES
The patient was asked if she would like a chaperone present for her intimate exam. She  Declined the chaperone.  Mat Norris

## 2020-06-24 NOTE — PROGRESS NOTES
light-headedness and headaches. Hematological: Negative for adenopathy. Does not bruise/bleed easily. Psychiatric/Behavioral: Negative for agitation, confusion, dysphoric mood and sleep disturbance. Objective:     Vitals:  /70   Ht 5' 4\" (1.626 m)   Wt 266 lb (120.7 kg)   BMI 45.66 kg/m²     Physical Exam  Constitutional:       General: Boyd Yates is not in acute distress. Appearance: Boyd Yates is well-developed. Boyd Yates is not diaphoretic. HENT:      Head: Normocephalic. Right Ear: External ear normal.      Left Ear: External ear normal.      Nose: Nose normal.   Eyes:      Conjunctiva/sclera: Conjunctivae normal.      Pupils: Pupils are equal, round, and reactive to light. Neck:      Thyroid: No thyromegaly. Trachea: No tracheal deviation. Cardiovascular:      Rate and Rhythm: Normal rate and regular rhythm. Heart sounds: Normal heart sounds. No murmur. No friction rub. No gallop. Pulmonary:      Effort: Pulmonary effort is normal. No respiratory distress. Breath sounds: Normal breath sounds. No wheezing or rales. Chest:      Chest wall: No tenderness. Breasts:         Right: No inverted nipple, mass, nipple discharge, skin change or tenderness. Left: No inverted nipple, mass, nipple discharge, skin change or tenderness. Abdominal:      General: Bowel sounds are normal. There is no distension. Palpations: Abdomen is soft. There is no mass. Tenderness: There is no abdominal tenderness. There is no guarding or rebound. Genitourinary:     Labia:         Right: No rash, tenderness or lesion. Left: No rash, tenderness or lesion. Vagina: Normal. No vaginal discharge, erythema, tenderness or bleeding. Cervix: No cervical motion tenderness, discharge or friability. Uterus: Not deviated, not enlarged, not fixed and not tender. Adnexa:         Right: No mass, tenderness or fullness.           Left: No Standing Expiration Date:   6/24/2021    T4, Free     Standing Status:   Future     Standing Expiration Date:   6/24/2021    Insulin, Total     Standing Status:   Future     Standing Expiration Date:   6/24/2021     No orders of the defined types were placed in this encounter. Will obtain some baseline labs to rule out any other underlying causes for amenorrhea. Patient is encouraged to move towards carbohydrate reduction and weight loss to prepare for pregnancy  Patient is currently taking Vistaril and Celexa and is going to see the psychotherapist this week for further direction for medication versus nonmedical treatment of her depression and anxiety  Follow up:  Return in about 1 year (around 6/24/2021) for annual examination.       Francia Dacosta, DO

## 2020-07-01 LAB
HPV COMMENT: NORMAL
HPV TYPE 16: NOT DETECTED
HPV TYPE 18: NOT DETECTED
HPVOH (OTHER TYPES): NOT DETECTED

## 2020-07-21 ENCOUNTER — TELEPHONE (OUTPATIENT)
Dept: FAMILY MEDICINE CLINIC | Age: 30
End: 2020-07-21

## 2020-07-21 NOTE — TELEPHONE ENCOUNTER
TELEPHONE INTAKE ENCOUNTER FOR PSYCHIATRIC VISIT    This questionnaire is helpful for Dr. Akbar Dennis for the purposes of preparing for your first appointment. This is not part of a diagnostic assessment or treatment plan, and is intended for information purposes only. You may choose to decline to answer any of these questions, your ability to schedule an appointment with Dr. Akbar Dennis is not dependent on your willingness to answer. (statement has been read to patient, patient agrees and has no concerns or questions)    [address]    Who referred you?  TABATHA Excell Guardian     In a few words, what symptoms are you hoping to address?  DEPRESSION, FELIZ, DISASSOCIATION, PANIC ATTACKS, INSOMNIA   How long have these symptoms been a problem?   o YEARS   Are you having sleep issues you'd like to address?   o YES  o How many hours a night do you sleep? 4    Are you interested in talking about a change to your psychiatric medications?  YES      SOCIAL HISTORY:     THE FOLLOWING IS REGARDING YOUR PERSONAL HISTORY:    With whom do you live?     What is the highest level of education you have received? SOME COLLEGE    Are you  or ?     Are you currently employed or on disability? FULL TIME    Do you have a Amish preference/francia or belief in God? NO    PSYCHIATRIC HISTORY:    Have you ever been diagnosed with depression, anxiety, or any other psychiatric condition?  DEPRESSION, FELIZ AND SUICIDAL IDEAL ATION     Are you currently taking any psychiatric medications?  CELEXA   VISTARIL    Are you currently taking any vitamins or supplements? What are they?  DAILY, FISH OIL,      Past psychiatric medications   ZOLOFT   SEROQUEL   TRAZADONE       Inform patient that they will need to have an appointment prior to refills on any current medications, so they will need to ensure they have adequate refills leading up to the appointment date. accompanying you to the appointment?  VIDEO       Do you have a current PCP?   TABATHA HODGES    Do you have any current thoughts of harming yourself?  NO    Informed patient: please arrive early to your appointment, there will be some paperwork for you to complete prior to your appointment. An appointment should last an hour but please be prepared to wait for up to 1/2-hour and schedule ample time to return to work after your appointment. You will not receive the link for doxy again so please save this in your files and log in at the time of the appt. You will not receive a reminder either so please be aware of the time and date of your appointment and log in a little before the appointment to check your link. If you are having trouble logging in the day of the appt, please call [phone]. Save this number in your phone for [facility]. If doing an appt over Doxy or MyChart Video, please wear headphones to your appointment if possible. Please make sure you are connected to WiFi. Do not attempt to have session while driving. Find somewhere you can talk in private. Please bring any relevant paperwork from your previous psychiatric appointments/assessments with you to your first appointment. You may also fax this paperwork to [employer] at [phone].

## 2020-07-31 ENCOUNTER — VIRTUAL VISIT (OUTPATIENT)
Dept: BEHAVIORAL/MENTAL HEALTH CLINIC | Age: 30
End: 2020-07-31
Payer: COMMERCIAL

## 2020-07-31 PROCEDURE — 90791 PSYCH DIAGNOSTIC EVALUATION: CPT | Performed by: PSYCHOLOGIST

## 2020-07-31 ASSESSMENT — PATIENT HEALTH QUESTIONNAIRE - PHQ9
1. LITTLE INTEREST OR PLEASURE IN DOING THINGS: 2
6. FEELING BAD ABOUT YOURSELF - OR THAT YOU ARE A FAILURE OR HAVE LET YOURSELF OR YOUR FAMILY DOWN: 1
2. FEELING DOWN, DEPRESSED OR HOPELESS: 1
SUM OF ALL RESPONSES TO PHQ9 QUESTIONS 1 & 2: 3
9. THOUGHTS THAT YOU WOULD BE BETTER OFF DEAD, OR OF HURTING YOURSELF: 2
3. TROUBLE FALLING OR STAYING ASLEEP: 3
4. FEELING TIRED OR HAVING LITTLE ENERGY: 3
SUM OF ALL RESPONSES TO PHQ QUESTIONS 1-9: 15
7. TROUBLE CONCENTRATING ON THINGS, SUCH AS READING THE NEWSPAPER OR WATCHING TELEVISION: 1
5. POOR APPETITE OR OVEREATING: 0
10. IF YOU CHECKED OFF ANY PROBLEMS, HOW DIFFICULT HAVE THESE PROBLEMS MADE IT FOR YOU TO DO YOUR WORK, TAKE CARE OF THINGS AT HOME, OR GET ALONG WITH OTHER PEOPLE: 1
8. MOVING OR SPEAKING SO SLOWLY THAT OTHER PEOPLE COULD HAVE NOTICED. OR THE OPPOSITE, BEING SO FIGETY OR RESTLESS THAT YOU HAVE BEEN MOVING AROUND A LOT MORE THAN USUAL: 2
SUM OF ALL RESPONSES TO PHQ QUESTIONS 1-9: 15
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
## 2020-08-10 ENCOUNTER — VIRTUAL VISIT (OUTPATIENT)
Dept: FAMILY MEDICINE CLINIC | Age: 30
End: 2020-08-10
Payer: COMMERCIAL

## 2020-08-10 PROCEDURE — 99213 OFFICE O/P EST LOW 20 MIN: CPT | Performed by: PHYSICIAN ASSISTANT

## 2020-08-31 ENCOUNTER — VIRTUAL VISIT (OUTPATIENT)
Dept: BEHAVIORAL/MENTAL HEALTH CLINIC | Age: 30
End: 2020-08-31
Payer: COMMERCIAL

## 2020-08-31 PROBLEM — G47.09 OTHER INSOMNIA: Status: ACTIVE | Noted: 2020-08-31

## 2020-08-31 PROCEDURE — 90792 PSYCH DIAG EVAL W/MED SRVCS: CPT | Performed by: PSYCHIATRY & NEUROLOGY

## 2020-08-31 RX ORDER — CITALOPRAM 20 MG/1
20 TABLET ORAL DAILY
Qty: 30 TABLET | Refills: 3 | Status: SHIPPED | OUTPATIENT
Start: 2020-08-31 | End: 2020-09-14 | Stop reason: DRUGHIGH

## 2020-08-31 RX ORDER — BUPROPION HYDROCHLORIDE 150 MG/1
150 TABLET ORAL EVERY MORNING
Qty: 30 TABLET | Refills: 3 | Status: SHIPPED | OUTPATIENT
Start: 2020-08-31 | End: 2020-09-21 | Stop reason: SDUPTHER
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
## 2020-09-05 DIAGNOSIS — G47.09 OTHER INSOMNIA: ICD-10-CM

## 2020-09-05 DIAGNOSIS — F39 MOOD DISORDER (HCC): ICD-10-CM

## 2020-09-05 LAB
ALBUMIN SERPL-MCNC: 4.3 G/DL (ref 3.5–4.6)
ALP BLD-CCNC: 60 U/L (ref 40–130)
ALT SERPL-CCNC: 32 U/L (ref 0–33)
ANION GAP SERPL CALCULATED.3IONS-SCNC: 13 MEQ/L (ref 9–15)
AST SERPL-CCNC: 14 U/L (ref 0–35)
BASOPHILS ABSOLUTE: 0.1 K/UL (ref 0–0.2)
BASOPHILS RELATIVE PERCENT: 0.4 %
BILIRUB SERPL-MCNC: <0.2 MG/DL (ref 0.2–0.7)
BILIRUBIN DIRECT: <0.2 MG/DL (ref 0–0.4)
BILIRUBIN, INDIRECT: NORMAL MG/DL (ref 0–0.6)
BUN BLDV-MCNC: 11 MG/DL (ref 6–20)
CALCIUM SERPL-MCNC: 9.3 MG/DL (ref 8.5–9.9)
CHLORIDE BLD-SCNC: 101 MEQ/L (ref 95–107)
CHOLESTEROL, FASTING: 164 MG/DL (ref 0–199)
CO2: 23 MEQ/L (ref 20–31)
CORTISOL - AM: 7.7 UG/DL (ref 6.2–19.4)
CREAT SERPL-MCNC: 0.54 MG/DL (ref 0.5–0.9)
EOSINOPHILS ABSOLUTE: 0 K/UL (ref 0–0.7)
EOSINOPHILS RELATIVE PERCENT: 0.3 %
FOLATE: 17 NG/ML (ref 7.3–26.1)
GFR AFRICAN AMERICAN: >60
GFR NON-AFRICAN AMERICAN: >60
GLOBULIN: 3.5 G/DL (ref 2.3–3.5)
GLUCOSE BLD-MCNC: 100 MG/DL (ref 70–99)
HBA1C MFR BLD: 5.6 % (ref 4.8–5.9)
HCT VFR BLD CALC: 43.1 % (ref 37–47)
HDLC SERPL-MCNC: 32 MG/DL (ref 40–59)
HEMOGLOBIN: 14.2 G/DL (ref 12–16)
LDL CHOLESTEROL CALCULATED: 91 MG/DL (ref 0–129)
LYMPHOCYTES ABSOLUTE: 3.4 K/UL (ref 1–4.8)
LYMPHOCYTES RELATIVE PERCENT: 23.3 %
MCH RBC QN AUTO: 28 PG (ref 27–31.3)
MCHC RBC AUTO-ENTMCNC: 33 % (ref 33–37)
MCV RBC AUTO: 84.8 FL (ref 82–100)
MONOCYTES ABSOLUTE: 0.9 K/UL (ref 0.2–0.8)
MONOCYTES RELATIVE PERCENT: 6 %
NEUTROPHILS ABSOLUTE: 10.3 K/UL (ref 1.4–6.5)
NEUTROPHILS RELATIVE PERCENT: 70 %
PDW BLD-RTO: 13 % (ref 11.5–14.5)
PLATELET # BLD: 375 K/UL (ref 130–400)
POTASSIUM SERPL-SCNC: 4.7 MEQ/L (ref 3.4–4.9)
PROLACTIN: 16.9 NG/ML
RBC # BLD: 5.08 M/UL (ref 4.2–5.4)
SODIUM BLD-SCNC: 137 MEQ/L (ref 135–144)
T4 FREE: 1.2 NG/DL (ref 0.84–1.68)
TOTAL PROTEIN: 7.8 G/DL (ref 6.3–8)
TRIGLYCERIDE, FASTING: 207 MG/DL (ref 0–150)
TSH REFLEX: 5 UIU/ML (ref 0.44–3.86)
VITAMIN B-12: 816 PG/ML (ref 232–1245)
VITAMIN D 25-HYDROXY: 36.9 NG/ML (ref 30–100)
WBC # BLD: 14.7 K/UL (ref 4.8–10.8)

## 2020-09-08 ENCOUNTER — TELEPHONE (OUTPATIENT)
Dept: BEHAVIORAL/MENTAL HEALTH CLINIC | Age: 30
End: 2020-09-08

## 2020-09-08 LAB — GROWTH HORMONE: 0.06 NG/ML (ref 0.05–8)

## 2020-09-14 ENCOUNTER — VIRTUAL VISIT (OUTPATIENT)
Dept: BEHAVIORAL/MENTAL HEALTH CLINIC | Age: 30
End: 2020-09-14
Payer: COMMERCIAL

## 2020-09-14 PROCEDURE — 99214 OFFICE O/P EST MOD 30 MIN: CPT | Performed by: PSYCHIATRY & NEUROLOGY

## 2020-09-14 RX ORDER — CITALOPRAM 10 MG/1
5 TABLET ORAL DAILY
Qty: 7 TABLET | Refills: 0 | Status: SHIPPED | OUTPATIENT
Start: 2020-09-14 | End: 2020-09-21 | Stop reason: DRUGHIGH

## 2020-09-14 RX ORDER — DULOXETIN HYDROCHLORIDE 20 MG/1
20 CAPSULE, DELAYED RELEASE ORAL DAILY
Qty: 30 CAPSULE | Refills: 1 | Status: SHIPPED | OUTPATIENT
Start: 2020-09-14 | End: 2020-09-21 | Stop reason: SDUPTHER

## 2020-09-14 NOTE — PROGRESS NOTES
9/14/2020    TELEHEALTH PSYCHIATRY FOLLOWUP -- Audio/Visual (During RWPFQ-24 public health emergency)      Psychiatric Diagnoses:  1. Mood disorder ([address]    2. Anxiety          Due to COVID 19 outbreak, patient's office visit was converted to a virtual visit. Patient was contacted and agreed to proceed with a virtual visit via [employer]  30 minutes with direct communication with patient for encounter The risks and benefits of converting to a virtual visit were discussed in light of the current infectious disease epidemic. Patient also understood that insurance coverage and co-pays are up to their individual insurance plans. Patient Location:        Patient's home address  Provider Location (City/State):        [place]        Assessment/Plan:   Celexa will taper off   Start Cymbalta     Medical Diagnoses:  Patient Active Problem List   Diagnosis    Allergy status to other drugs, medicaments and biological substances status    Allergy status to penicillin    Antiphospholipid syndrome (HCC)    Anxiety    Hypothyroidism, unspecified    Mood disorder (HCC)    Morbid (severe) obesity due to excess calories ([address]    Myelofibrosis ([address]    Other visual disturbances    Personal history of nicotine dependence    Other adrenal hypofunction    Other insomnia           DATE and changes made   8/31  o Decreased Celexa from 40 mg QD to 20 mg QD   o Stopped omeprazole   o STARTED Wellbutrin  mg QD    9/14/20  o Worsening depressed mood   o Celexa 20 mg QD will decrease to 5 mg for 7 days then off  o START low dose of Cymbalta today 20 mg QD   o Explained risk of serotonin syndrome, patient is aware of risk agrees to go to ER with any symptoms of confusion, muscle rigidity or tremor or fevers       AT TODAY'S VISIT   1. START cymbalta 20 mg QD   2. DECREASE CELEXA to 5 mg QD for 7 days then off   3. Continue Wellbutrin XL mg QD   4. No Labs ordered today - considering [employer] testing   5.  Crisis plan reviewed denies auditory or visual hallucinations  Cognition:  no deficits in attention, concentration notable, recent memory grossly intact  Concentration intact  Memory intact  Insight good   Judgement fair   Fund of Knowledge adequate    Treatment Plan:  Reviewed current Medications with the patient. Education provided on the compliance with treatment. Reviewed OARRs, no concerns identified     The anticipated benefits and side effects of the medications, including the anticipated results of not receiving the medication, and of alternatives to the medications were explained to the patient and their informed consent was obtained for starting medications as well as adjusting the doses (titration or tapering) as indicated. The above information was given by physician in verbal form and sufficient understanding was in evidence. The patient participated in discussion of the information and question and/or concerns were addressed before the medication was given. PSYCHOTHERAPY/COUNSELING:  Encourage patient to attend outpatient appointments and therapy. [x] Therapeutic interview  [] Supportive  [x] CBT  [x] Ongoing  [] Other    No follow-ups on file. Follow-up in 2 weeks, patient informed to call for follow-up    Please note this report has been partially produced using speech recognition software  And may cause contain errors related to that system including grammar, punctuation and spelling as well as words and phrases that may seem inappropriate. If there are questions or concerns please feel free to contact me to clarify. Uriel Britton MD  Electronically signed by Uriel Britton MD on 9/14/2020 at 12:46 PM  9/14/2020 12:46 PM    Psychiatry   Due to this being a TeleHealth encounter, evaluation of the following organ systems is limited: Vitals/Constitutional/EENT/Resp/CV/GI//MS/Neuro/Skin/Heme-Lymph-Imm. An  electronic signature was used to authenticate this note.   --Uriel Britton MD on 9/14/2020 at 12:46 PM    Pursuant to the emergency declaration under the [facility] waiver authority and the [employer] Act, this Virtual  Visit was conducted, with patient's consent, to reduce the patient's risk of exposure to COVID-19 and provide continuity of care for an established patient Services were provided through a video synchronous discussion virtually to substitute for in-person clinic visit.

## 2020-09-21 ENCOUNTER — VIRTUAL VISIT (OUTPATIENT)
Dept: BEHAVIORAL/MENTAL HEALTH CLINIC | Age: 30
End: 2020-09-21
Payer: COMMERCIAL

## 2020-09-21 PROCEDURE — 99214 OFFICE O/P EST MOD 30 MIN: CPT | Performed by: PSYCHIATRY & NEUROLOGY

## 2020-09-21 RX ORDER — CITALOPRAM 10 MG/1
5 TABLET ORAL DAILY
Qty: 15 TABLET | Refills: 1 | Status: SHIPPED | OUTPATIENT
Start: 2020-09-21 | End: 2020-10-12 | Stop reason: SDUPTHER

## 2020-09-21 RX ORDER — BUPROPION HYDROCHLORIDE 150 MG/1
150 TABLET ORAL EVERY MORNING
Qty: 30 TABLET | Refills: 3 | Status: SHIPPED | OUTPATIENT
Start: 2020-09-21 | End: 2020-10-12 | Stop reason: SDUPTHER

## 2020-09-21 RX ORDER — DULOXETIN HYDROCHLORIDE 20 MG/1
20 CAPSULE, DELAYED RELEASE ORAL DAILY
Qty: 30 CAPSULE | Refills: 1 | Status: SHIPPED | OUTPATIENT
Start: 2020-09-21 | End: 2020-10-12

## 2020-09-21 RX ORDER — PROPRANOLOL HYDROCHLORIDE 10 MG/1
10 TABLET ORAL 3 TIMES DAILY PRN
Qty: 90 TABLET | Refills: 3 | Status: SHIPPED | OUTPATIENT
Start: 2020-09-21 | End: 2020-10-12 | Stop reason: SDUPTHER

## 2020-09-21 NOTE — PROGRESS NOTES
9/21/2020    TELEHEALTH PSYCHIATRY FOLLOWUP -- Audio/Visual (During PDEDT-34 public health emergency)      Psychiatric Diagnoses:  1. Mood disorder ([address]    2. FELIZ (generalized anxiety disorder)    3. Other insomnia          Due to COVID 19 outbreak, patient's office visit was converted to a virtual visit. Patient was contacted and agreed to proceed with a virtual visit via DOXY  30 minutes with direct communication with patient for encounter The risks and benefits of converting to a virtual visit were discussed in light of the current infectious disease epidemic. Patient also understood that insurance coverage and co-pays are up to their individual insurance plans. Patient Location:        Patient's home address  Provider Location ([facility]/State):        [place]        Assessment/Plan:   Prolong celexa taper and get genesite     Medical Diagnoses:  Patient Active Problem List   Diagnosis    Allergy status to other drugs, medicaments and biological substances status    Allergy status to penicillin    Antiphospholipid syndrome (HCC)    Anxiety    Hypothyroidism, unspecified    Mood disorder (HCC)    Morbid (severe) obesity due to excess calories ([address]    Myelofibrosis ([address]    Other visual disturbances    Personal history of nicotine dependence    Other adrenal hypofunction    Other insomnia           DATE and changes made  · 8/31  ? Decreased Celexa from 40 mg QD to 20 mg QD   ? Stopped omeprazole   ? STARTED Wellbutrin  mg QD   · 9/14/20  ? Worsening depressed mood   ? Celexa 20 mg QD will decrease to 5 mg for 7 days then off  ? START low dose of Cymbalta today 20 mg QD   ? Explained risk of serotonin syndrome, patient is aware of risk agrees to go to ER with any symptoms of confusion, muscle rigidity or tremor or fevers  · 9/21/20  ? Depression improvement   ? Celexa taper is going to be complete today or tomorrow pt states   ? Continue wellbutrin   ?  START Propranolol 10 mg TID PRN anxiety ? GENESite     AT TODAY'S VISIT   1. Continue taper but will continue celexa 5 mg as having panic attacks. Will continue for another 3 weeks and monitor for side effects and serotonin syndrome, has been tolerated thus far. 2. No Labs ordered today   3. Crisis plan reviewed and patient verbally contracts for safety. Go to ED with emergent symptoms or safety concerns. 4. Risks, benefits, side effects of medications, including any / all black box warnings, discussed with patient, who verbalizes their understanding. Pt is deisi for safety and denies thoughts of SI/HI. Amenable to plan. No acute concerns to address regarding medications. Subjective:      Pt reports doing well with cross taper   Anxiety has been \"pretty bad, usually a 6 or a 7\" which patient says is unchanged   Also says her anxiety is higher recently, also worsening to the point of almost having panic attacks at work   Sleep is unchanged but energy is better and appetite is less   Says she has not had fever or tremors or felt ill   Is trying to stay active and motivated throughout the day   Walks about once a day a mile or two     Patient reports they have been compliant with current medication regimen and have not missed a dose. Patient denies medication side effects. At today's visit, patient denies thoughts of harm to self or others since last appointment, and denies auditory or visual hallucinations.      At today's visit, pt reports that since our last visit, their average MOOD has been (on a scale of 1-10, with 1 being severely depressed and 10 being not depressed at all)  Very depressed [] 1  [] 2  [] 3  [] 4  [] 5  [] 6  [x] 7  [] 8  [] 9  [] 10  Not depressed    At today's visit, pt reports that since our last visit, their average ANXIETY has been (on a scale of 1-10, with 10 being severely anxious and 1 being not anxious at all)  Not anxious [] 1     [] 2     [] 3     [] 4   [] 5    [] 6      [x] 7   [] 8 [] 9       [] 10  Very anxious       At today's visit, pt reports that since our last visit, their average APPETITE has been    [] Decreased     [] Normal/Unchanged   [] Increased      At today's visit, pt reports that since our last visit, their average HOURS OF SLEEP (every 24 hours) has been    [] 0-3   [x] 4-5    [] 5-6    [] 6-7    [] 8-9    [] 10-11   [] 11+    At today's visit, pt reports that since our last visit, their average Energy has been     [] Poor    [] Average  [x] Increased         Aggression:  [] yes  [x] no    Patient is [x] Able to contract for safety  [] unable to CONTRACT FOR SAFETY     ROS:  [x] All negative/unchanged except if checked. Explain positive(checked items) below:     [] Constitutional  [] Eyes  [] Ear/Nose/Mouth/Throat  [] Respiratory  [] CV  [] GI  []   [] Musculoskeletal  [] Skin/Breast  [] Neurological  [] Endocrine  [] Heme/Lymph  [] Allergic/Immunologic      MEDICATIONS:    Current Outpatient Medications:     DULoxetine (CYMBALTA) 20 MG extended release capsule, Take 1 capsule by mouth daily, Disp: 30 capsule, Rfl: 1    citalopram (CELEXA) 10 MG tablet, Take 0.5 tablets by mouth daily Then off, Disp: 7 tablet, Rfl: 0    buPROPion (WELLBUTRIN XL) 150 MG extended release tablet, Take 1 tablet by mouth every morning, Disp: 30 tablet, Rfl: 3    vitamin D (D3-50) [address] UNIT CAPS, Take 1 capsule by mouth once a week, Disp: 12 capsule, Rfl: 0    Examination:    Vitals: not taken in person, most recent vitals in chart reviewed  There were no vitals filed for this visit.     Wt Readings from Last 3 Encounters:   06/24/20 266 lb (120.7 kg)   06/05/20 270 lb (122.5 kg)   05/31/18 277 lb 6.4 oz (125.8 kg)       Labs:   no recent labs    Mental Status Examination:    Level of consciousness:  alert and oriented to person, place, and situation  Appearance:  well-appearing good grooming and good hygiene  Behavior/Motor:  no abnormalities noted  Attitude toward examiner:  attentive and good eye contact  Speech:  spontaneous, normal rate and normal volume   Mood: \"better\"  Affect:  mood congruent  Thought processes:  linear and logical  Thought content:  Denies suicidal or homicidal ideation, denies auditory or visual hallucinations  Cognition:  no deficits in attention, concentration notable, recent memory grossly intact  Concentration intact  Memory intact  Insight good   Judgement fair   Fund of Knowledge adequate    Treatment Plan:  Reviewed current Medications with the patient. Education provided on the compliance with treatment. Reviewed OARRs, no concerns identified     The anticipated benefits and side effects of the medications, including the anticipated results of not receiving the medication, and of alternatives to the medications were explained to the patient and their informed consent was obtained for starting medications as well as adjusting the doses (titration or tapering) as indicated. The above information was given by physician in verbal form and sufficient understanding was in evidence. The patient participated in discussion of the information and question and/or concerns were addressed before the medication was given. PSYCHOTHERAPY/COUNSELING:  Encourage patient to attend outpatient appointments and therapy. [x] Therapeutic interview  [] Supportive  [x] CBT  [x] Ongoing  [] Other    No follow-ups on file. Follow-up in 2 weeks, patient informed to call for follow-up    Please note this report has been partially produced using speech recognition software  And may cause contain errors related to that system including grammar, punctuation and spelling as well as words and phrases that may seem inappropriate. If there are questions or concerns please feel free to contact me to clarify.     Vanessa Archer MD  Electronically signed by Vanessa Archer MD on 9/21/2020 at 12:14 PM  9/21/2020 12:14 PM    Psychiatry   Due to this being a TeleHealth encounter, evaluation of the following organ systems is limited: Vitals/Constitutional/EENT/Resp/CV/GI//MS/Neuro/Skin/Heme-Lymph-Imm. An  electronic signature was used to authenticate this note. --Darlene Monroe MD on 9/21/2020 at 12:14 PM    Pursuant to the emergency declaration under the [address] [place] [address] waiver authority and the [employer] and [employer] Act, this Virtual  Visit was conducted, with patient's consent, to reduce the patient's risk of exposure to COVID-19 and provide continuity of care for an established patient Services were provided through a video synchronous discussion virtually to substitute for in-person clinic visit.

## 2020-09-24 ENCOUNTER — OFFICE VISIT (OUTPATIENT)
Dept: PULMONOLOGY | Age: 30
End: 2020-09-24
Payer: COMMERCIAL

## 2020-09-24 VITALS
HEART RATE: 64 BPM | BODY MASS INDEX: 46.1 KG/M2 | HEIGHT: 64 IN | OXYGEN SATURATION: 97 % | WEIGHT: 270 LBS | RESPIRATION RATE: 16 BRPM | TEMPERATURE: 97 F | SYSTOLIC BLOOD PRESSURE: 108 MMHG | DIASTOLIC BLOOD PRESSURE: 64 MMHG

## 2020-09-24 PROBLEM — G47.00 INSOMNIA: Status: ACTIVE | Noted: 2020-08-31

## 2020-09-24 PROBLEM — G47.30 SLEEP APNEA: Status: ACTIVE | Noted: 2020-09-24

## 2020-09-24 PROCEDURE — 99204 OFFICE O/P NEW MOD 45 MIN: CPT | Performed by: INTERNAL MEDICINE

## 2020-09-24 RX ORDER — HYDROXYZINE PAMOATE 25 MG/1
CAPSULE ORAL
Status: ON HOLD | COMMUNITY
Start: 2020-08-24 | End: 2021-10-27 | Stop reason: HOSPADM

## 2020-09-24 ASSESSMENT — ENCOUNTER SYMPTOMS
SINUS PRESSURE: 0
SHORTNESS OF BREATH: 0
VOICE CHANGE: 0
ABDOMINAL PAIN: 0
WHEEZING: 0
CHEST TIGHTNESS: 0
NAUSEA: 0
SORE THROAT: 0
VOMITING: 0
EYE ITCHING: 0
RHINORRHEA: 0
TROUBLE SWALLOWING: 0
COUGH: 0
EYE DISCHARGE: 0
DIARRHEA: 0

## 2020-09-24 NOTE — PROGRESS NOTES
Inability: Not on file    Transportation needs     Medical: Not on file     Non-medical: Not on file   Tobacco Use    Smoking status: Former Smoker     Types: Cigarettes     Last attempt to quit: 7/3/2015     Years since quittin.2    Smokeless tobacco: Never Used   Substance and Sexual Activity    Alcohol use: No    Drug use: No    Sexual activity: Yes     Partners: Male, Female   Lifestyle    Physical activity     Days per week: Not on file     Minutes per session: Not on file    Stress: Not on file   Relationships    Social connections     Talks on phone: Not on file     Gets together: Not on file     Attends Taoism service: Not on file     Active member of club or organization: Not on file     Attends meetings of clubs or organizations: Not on file     Relationship status: Not on file    Intimate partner violence     Fear of current or ex partner: Not on file     Emotionally abused: Not on file     Physically abused: Not on file     Forced sexual activity: Not on file   Other Topics Concern    Not on file   Social History Narrative    Not on file         Review of Systems   Constitutional: Negative for chills, diaphoresis, fatigue and fever. HENT: Negative for congestion, mouth sores, nosebleeds, postnasal drip, rhinorrhea, sinus pressure, sneezing, sore throat, trouble swallowing and voice change. Snoring    Eyes: Negative for discharge, itching and visual disturbance. Respiratory: Negative for cough, chest tightness, shortness of breath and wheezing. Cardiovascular: Negative for chest pain, palpitations and leg swelling. Gastrointestinal: Negative for abdominal pain, diarrhea, nausea and vomiting. Genitourinary: Negative for difficulty urinating and hematuria. Musculoskeletal: Negative for arthralgias, joint swelling and myalgias. Skin: Negative for rash. Allergic/Immunologic: Negative for environmental allergies and food allergies.    Neurological: Negative for DULoxetine (CYMBALTA) 20 MG extended release capsule Take 1 capsule by mouth daily 30 capsule 1    buPROPion (WELLBUTRIN XL) 150 MG extended release tablet Take 1 tablet by mouth every morning 30 tablet 3     No current facility-administered medications for this visit. Assessment/Plan:     1. Sleep apnea, unspecified type  She is complaining of snoring but not all the time  and daytime sleepiness and tiredness. Not sure about , witness apnea. She wakes up with gasping for air. C/o wakes up frequently during sleep . complaint of morning headache 2-3 times a week. She  does not have restful sleep. recommend to have sleep study to r/o ZOË. Sleep study is requested. -PSG requested. 2. Insomnia, unspecified type  She has chronic insomnia , she said sh take vistaril at night time to help to fall a  sleep     3. Anxiety  She has chronic anxiety , she is following gianna virgen. Celexa, cymbalta, wellbutin  And hydroxyzine    4. Morbid obesity ([address]  Patient patient is advised try to lose weight. obesity related risk explained to the patient ,  Current weight:  270 lb (122.5 kg) Lbs. BMI:  Body mass index is 46.35 kg/m². Suggested weight control approaches, including dietary changes , exercise, behavioral modification. Return in about 4 weeks (around 10/22/2020) for zoë.       Doris Garza MD

## 2020-10-12 ENCOUNTER — PATIENT MESSAGE (OUTPATIENT)
Dept: FAMILY MEDICINE CLINIC | Age: 30
End: 2020-10-12

## 2020-10-12 ENCOUNTER — VIRTUAL VISIT (OUTPATIENT)
Dept: BEHAVIORAL/MENTAL HEALTH CLINIC | Age: 30
End: 2020-10-12
Payer: COMMERCIAL

## 2020-10-12 PROCEDURE — 99214 OFFICE O/P EST MOD 30 MIN: CPT | Performed by: PSYCHIATRY & NEUROLOGY

## 2020-10-12 RX ORDER — BUPROPION HYDROCHLORIDE 150 MG/1
150 TABLET ORAL EVERY MORNING
Qty: 30 TABLET | Refills: 3 | Status: SHIPPED | OUTPATIENT
Start: 2020-10-12

## 2020-10-12 RX ORDER — CITALOPRAM 10 MG/1
5 TABLET ORAL DAILY
Qty: 15 TABLET | Refills: 1 | Status: SHIPPED | OUTPATIENT
Start: 2020-10-12 | End: 2021-04-06

## 2020-10-12 RX ORDER — FAMOTIDINE 20 MG/1
20 TABLET, FILM COATED ORAL 2 TIMES DAILY
Qty: 60 TABLET | Refills: 3 | Status: SHIPPED | OUTPATIENT
Start: 2020-10-12

## 2020-10-12 RX ORDER — PROPRANOLOL HYDROCHLORIDE 20 MG/1
20 TABLET ORAL 3 TIMES DAILY PRN
Qty: 90 TABLET | Refills: 2 | Status: SHIPPED | OUTPATIENT
Start: 2020-10-12 | End: 2021-05-21 | Stop reason: SDUPTHER

## 2020-10-12 RX ORDER — DULOXETIN HYDROCHLORIDE 20 MG/1
20 CAPSULE, DELAYED RELEASE ORAL 2 TIMES DAILY
Qty: 60 CAPSULE | Refills: 3 | Status: SHIPPED | OUTPATIENT
Start: 2020-10-12 | Stop reason: SDUPTHER

## 2020-10-12 NOTE — TELEPHONE ENCOUNTER
From: Arnoldo Kaiser  To: Verner Bunkers, PA-C  Sent: 10/12/2020 12:15 PM EDT  Subject: Prescription Question    dr.shelley Efren stopped my omeprazole because of the interaction with the celexa I was taking. I am now at a much lower dose of celexa and am having a very difficult time without the omeprazole. Would it be possible to get a new prescription for that?

## 2020-10-12 NOTE — PROGRESS NOTES
10/12/2020    TELEHEALTH PSYCHIATRY FOLLOWUP -- Audio/Visual (During RJIEA-47 public health emergency)      Psychiatric Diagnoses:  1. Major depressive disorder, recurrent episode, moderate ([address]    2. FELIZ (generalized anxiety disorder)          Due to COVID 23 outbreak, patient's office visit was converted to a virtual visit. Patient was contacted and agreed to proceed with a virtual visit via DOXY  30 minutes with direct communication with patient for encounter The risks and benefits of converting to a virtual visit were discussed in light of the current infectious disease epidemic. Patient also understood that insurance coverage and co-pays are up to their individual insurance plans. Patient Location:        Patient's home address  Provider Location (City/State):        [address] [place] [address]        Assessment/Plan:   No acute concerns but will begin a more aggressive taper to high dose cymbalta, given genesight results, with plan to stop celexa once this can be tolerated. Currrent celexa dose is only 5 mg QD, low risk of Serotonin syndrome with this but counseled patients on risks and benefits and she will monitor for any warning signs of this. Will increase propranolol     Medical Diagnoses:  Patient Active Problem List   Diagnosis    Allergy status to other drugs, medicaments and biological substances status    Allergy status to penicillin    Antiphospholipid syndrome (HCC)    Anxiety    Hypothyroidism, unspecified    Mood disorder (HCC)    Morbid obesity (HCC)    Myelofibrosis (HCC)    Other visual disturbances    Personal history of nicotine dependence    Other adrenal hypofunction    Insomnia    Sleep apnea           DATE and changes made (prior meds Seroquel, Zoloft, Lexapro, Trazodone, Currently on Celexa (for 5 months) and Vistaril)  · 8/31  ? Decreased Celexa from 40 mg QD to 20 mg QD   ? Stopped omeprazole   ? STARTED Wellbutrin  mg QD   · 9/14/20  ?  Worsening depressed mood   ? Celexa 20 mg missed a dose. Patient denies medication side effects. At today's visit, patient denies thoughts of harm to self or others since last appointment, and denies auditory or visual hallucinations. At today's visit, pt reports that since our last visit, their average MOOD has been (on a scale of 1-10, with 1 being severely depressed and 10 being not depressed at all)  Very depressed [] 1  [] 2  [x] 3  [] 4  [] 5  [] 6  [] 7  [] 8  [] 9  [] 10  Not depressed    At today's visit, pt reports that since our last visit, their average ANXIETY has been (on a scale of 1-10, with 10 being severely anxious and 1 being not anxious at all)  Not anxious [] 1     [] 2     [] 3     [] 4   [] 5    [] 6      [] 7   [x] 8   [] 9       [] 10  Very anxious       At today's visit, pt reports that since our last visit, their average APPETITE has been    [] Decreased     [x] Normal/Unchanged   [] Increased      At today's visit, pt reports that since our last visit, their average HOURS OF SLEEP (every 24 hours) has been    [] 0-3   [] 4-5    [] 5-6    [] 6-7    [x] 8-9    [] 10-11   [] 11+    At today's visit, pt reports that since our last visit, their average Energy has been     [] Poor    [x] Average  [] Increased         Aggression:  [] yes  [x] no    Patient is [x] Able to contract for safety  [] unable to CONTRACT FOR SAFETY     ROS:  [x] All negative/unchanged except if checked.  Explain positive(checked items) below:     [] Constitutional  [] Eyes  [] Ear/Nose/Mouth/Throat  [] Respiratory  [] CV  [] GI  []   [] Musculoskeletal  [] Skin/Breast  [] Neurological  [] Endocrine  [] Heme/Lymph  [] Allergic/Immunologic      MEDICATIONS:    Current Outpatient Medications:     DULoxetine (CYMBALTA) 20 MG extended release capsule, Take 1 capsule by mouth 2 times daily, Disp: 60 capsule, Rfl: 3    propranolol (INDERAL) 20 MG tablet, Take 1 tablet by mouth 3 times daily as needed (anxiety), Disp: 90 tablet, Rfl: 2    citalopram (CELEXA) 10 MG tablet, Take 0.5 tablets by mouth daily, Disp: 15 tablet, Rfl: 1    buPROPion (WELLBUTRIN XL) 150 MG extended release tablet, Take 1 tablet by mouth every morning, Disp: 30 tablet, Rfl: 3    hydrOXYzine (VISTARIL) 25 MG capsule, take one capsule by mouth 4 times daily as needed for anxiety, Disp: , Rfl:     Examination:    Vitals: not taken in person, most recent vitals in chart reviewed  There were no vitals filed for this visit. Wt Readings from Last 3 Encounters:   09/24/20 270 lb (122.5 kg)   06/24/20 266 lb (120.7 kg)   06/05/20 270 lb (122.5 kg)       Labs:   no recent labs    Mental Status Examination:    Level of consciousness:  alert and oriented to person, place, and situation  Appearance:  well-appearing good grooming and good hygiene  Behavior/Motor:  no abnormalities noted  Attitude toward examiner:  attentive and good eye contact  Speech:  spontaneous, normal rate and normal volume   Mood: \"anxious, down\"  Affect:  mood congruent  Thought processes:  linear and logical  Thought content:  Denies suicidal or homicidal ideation, denies auditory or visual hallucinations  Cognition:  no deficits in attention, concentration notable, recent memory grossly intact  Concentration intact  Memory intact  Insight good   Judgement fair   Fund of Knowledge adequate    Treatment Plan:  Reviewed current Medications with the patient. Education provided on the compliance with treatment. Reviewed OARRs, no concerns identified     The anticipated benefits and side effects of the medications, including the anticipated results of not receiving the medication, and of alternatives to the medications were explained to the patient and their informed consent was obtained for starting medications as well as adjusting the doses (titration or tapering) as indicated. The above information was given by physician in verbal form and sufficient understanding was in evidence.  The patient participated in discussion of the information and question and/or concerns were addressed before the medication was given. PSYCHOTHERAPY/COUNSELING:  Encourage patient to attend outpatient appointments and therapy. [x] Therapeutic interview  [] Supportive  [x] CBT  [x] Ongoing  [] Other    No follow-ups on file. Follow-up in 2 weeks, patient informed to call for follow-up    Please note this report has been partially produced using speech recognition software  And may cause contain errors related to that system including grammar, punctuation and spelling as well as words and phrases that may seem inappropriate. If there are questions or concerns please feel free to contact me to clarify. Josiane Rivas MD  Electronically signed by Josiane Rivas MD on 10/12/2020 at 11:10 AM  10/12/2020 11:10 AM    Psychiatry   Due to this being a TeleHealth encounter, evaluation of the following organ systems is limited: Vitals/Constitutional/EENT/Resp/CV/GI//MS/Neuro/Skin/Heme-Lymph-Imm. An  electronic signature was used to authenticate this note. --Josiane Rivas MD on 10/12/2020 at 11:10 AM    Pursuant to the emergency declaration under the [address] [facility] waiver authority and the [employer] and [employer] Act, this Virtual  Visit was conducted, with patient's consent, to reduce the patient's risk of exposure to COVID-19 and provide continuity of care for an established patient Services were provided through a video synchronous discussion virtually to substitute for in-person clinic visit.

## 2020-10-27 ENCOUNTER — VIRTUAL VISIT (OUTPATIENT)
Dept: BEHAVIORAL/MENTAL HEALTH CLINIC | Age: 30
End: 2020-10-27
Payer: COMMERCIAL

## 2020-10-27 PROBLEM — F41.1 GAD (GENERALIZED ANXIETY DISORDER): Status: ACTIVE | Noted: 2020-10-27

## 2020-10-27 PROBLEM — F33.1 MODERATE EPISODE OF RECURRENT MAJOR DEPRESSIVE DISORDER (HCC): Status: ACTIVE | Noted: 2020-10-27

## 2020-10-27 PROCEDURE — 99214 OFFICE O/P EST MOD 30 MIN: CPT | Performed by: PSYCHIATRY & NEUROLOGY

## 2020-10-27 NOTE — PROGRESS NOTES
10/27/2020    TELEHEALTH PSYCHIATRY FOLLOWUP -- Audio/Visual (During TNVKD-96 public health emergency)      Psychiatric Diagnoses:  1. Moderate episode of recurrent major depressive disorder (HCC)          Due to COVID 19 outbreak, patient's office visit was converted to a virtual visit. Patient was contacted and agreed to proceed with a virtual visit via DOXY  30 minutes with direct communication with patient for encounter The risks and benefits of converting to a virtual visit were discussed in light of the current infectious disease epidemic. Patient also understood that insurance coverage and co-pays are up to their individual insurance plans. Patient Location:        Patient's home address  Provider Location (City/State):        [address]        Assessment/Plan:   Pt doing well but says she has not been able to get the BID cymbalta so cont to have worsened depressed mood in the afternoon   Anxiety improving    Medical Diagnoses:  Patient Active Problem List   Diagnosis    Allergy status to other drugs, medicaments and biological substances status    Allergy status to penicillin    Antiphospholipid syndrome ([address]    Anxiety    Hypothyroidism, unspecified    Mood disorder ([address]    Morbid obesity ([address]    Myelofibrosis ([address]    Other visual disturbances    Personal history of nicotine dependence    Other adrenal hypofunction    Insomnia    Sleep apnea    Moderate episode of recurrent major depressive disorder ([address]           DATE and changes made  Celexa (for 5 months) and Vistaril)  · 8/31  ? Decreased Celexa from 40 mg QD to 20 mg QD   ? Stopped omeprazole   ? STARTED Wellbutrin  mg QD   · 9/14/20  ? Worsening depressed mood   ? Celexa 20 mg QD will decrease to 5 mg for 7 days then off  ? START low dose of Cymbalta today 20 mg QD   ?  Explained risk of serotonin syndrome, patient is aware of risk agrees to go to ER with any symptoms of confusion, muscle rigidity or tremor or fevers  · 9/21/20  ? Depression improvement   ? Celexa taper is going to be complete today or tomorrow pt states   ? Continue wellbutrin   ? START Propranolol 10 mg TID PRN anxiety   ? GENESite   · 10/12  ? Review gene sight - ultrarapid 2d6   ? CYMBALTA WILL INCREASE TO 20 mg BID (may need TID)   § Per gene sight, patient is ultrarapid 2d6 metabolizer so will likely require higher dose of cymbalta if this is to be effective   ? Second option would be to try vilazodone, which had no interactions   ? CONT Wellbutrin  mg (no gene interactions)   ? Cont celexa 5 mg QD - has had difficulty stopping altogether   ? INCREASE propranolol to 20 mg TID PRN anxiety   § Has tolerated 10 mg, also will have rapid metabolism per genesight results 2d6 so may need higher or more frequent doses   · 10/27  ? Continue Cymbalta   § 20 mg BID   ? Celexa   § 5 mg QD   ? Wellbutrin XL  § 150 mg QD  ? Propranolol PRN, Vistaril PRN       AT TODAY'S VISIT     1. No Labs ordered today   2. Crisis plan reviewed and patient verbally contracts for safety. Go to ED with emergent symptoms or safety concerns. 3. Risks, benefits, side effects of medications, including any / all black box warnings, discussed with patient, who verbalizes their understanding. Pt is deisi for safety and denies thoughts of SI/HI. Amenable to plan. No acute concerns to address regarding medications. Subjective:      Pt reports she is doing well on current medication regimen   Some decreased libido   Is able to focus and concentrate   Says she sometimes does feel like she will be more depressed in the afternoon when cymbalta wears off, pharmacy had filled for once a day despite the BID being sent in     Patient reports they have been compliant with current medication regimen and have not missed a dose. Patient denies medication side effects.      At today's visit, patient denies thoughts of harm to self or others since last appointment, and mouth every morning, Disp: 30 tablet, Rfl: 3    famotidine (PEPCID) 20 MG tablet, Take 1 tablet by mouth 2 times daily, Disp: 60 tablet, Rfl: 3    hydrOXYzine (VISTARIL) 25 MG capsule, take one capsule by mouth 4 times daily as needed for anxiety, Disp: , Rfl:     Examination:    Vitals: not taken in person, most recent vitals in chart reviewed  There were no vitals filed for this visit. Wt Readings from Last 3 Encounters:   09/24/20 270 lb (122.5 kg)   06/24/20 266 lb (120.7 kg)   06/05/20 270 lb (122.5 kg)       Labs:   no recent labs    Mental Status Examination:    Level of consciousness:  alert and oriented to person, place, and situation  Appearance:  well-appearing good grooming and good hygiene  Behavior/Motor:  no abnormalities noted  Attitude toward examiner:  attentive and good eye contact  Speech:  spontaneous, normal rate and normal volume   Mood: \"better\"  Affect:  mood congruent  Thought processes:  linear and logical  Thought content:  Denies suicidal or homicidal ideation, denies auditory or visual hallucinations  Cognition:  no deficits in attention, concentration notable, recent memory grossly intact  Concentration intact  Memory intact  Insight good   Judgement fair   Fund of Knowledge adequate    Treatment Plan:  Reviewed current Medications with the patient. Education provided on the compliance with treatment. Reviewed OARRs, no concerns identified     The anticipated benefits and side effects of the medications, including the anticipated results of not receiving the medication, and of alternatives to the medications were explained to the patient and their informed consent was obtained for starting medications as well as adjusting the doses (titration or tapering) as indicated. The above information was given by physician in verbal form and sufficient understanding was in evidence.  The patient participated in discussion of the information and question and/or concerns were addressed before the medication was given. PSYCHOTHERAPY/COUNSELING:  Encourage patient to attend outpatient appointments and therapy. [x] Therapeutic interview  [] Supportive  [x] CBT  [x] Ongoing  [] Other    No follow-ups on file. Follow-up in 2 weeks, patient informed to call for follow-up    Please note this report has been partially produced using speech recognition software  And may cause contain errors related to that system including grammar, punctuation and spelling as well as words and phrases that may seem inappropriate. If there are questions or concerns please feel free to contact me to clarify. Melba Otoole MD  Electronically signed by Melba Otoole MD on 10/27/2020 at 11:27 AM  10/27/2020 11:27 AM    Psychiatry   Due to this being a TeleHealth encounter, evaluation of the following organ systems is limited: Vitals/Constitutional/EENT/Resp/CV/GI//MS/Neuro/Skin/Heme-Lymph-Imm. An  electronic signature was used to authenticate this note. --Melba Otoole MD on 10/27/2020 at 11:27 AM    Pursuant to the emergency declaration under the [facility] waiver authority and the [employer] Act, this Virtual  Visit was conducted, with patient's consent, to reduce the patient's risk of exposure to COVID-19 and provide continuity of care for an established patient Services were provided through a video synchronous discussion virtually to substitute for in-person clinic visit.

## 2020-11-24 ENCOUNTER — VIRTUAL VISIT (OUTPATIENT)
Dept: BEHAVIORAL/MENTAL HEALTH CLINIC | Age: 30
End: 2020-11-24
Payer: COMMERCIAL

## 2020-11-24 PROCEDURE — 99214 OFFICE O/P EST MOD 30 MIN: CPT | Performed by: PSYCHIATRY & NEUROLOGY

## 2020-11-24 RX ORDER — QUETIAPINE FUMARATE 25 MG/1
25 TABLET, FILM COATED ORAL NIGHTLY
Qty: 30 TABLET | Refills: 3 | Status: SHIPPED | OUTPATIENT
Start: 2020-11-24 | End: 2021-05-11

## 2020-11-24 RX ORDER — HYDROXYZINE 50 MG/1
50 TABLET, FILM COATED ORAL EVERY 6 HOURS PRN
Qty: 120 TABLET | Refills: 2 | Status: SHIPPED | OUTPATIENT
Start: 2020-11-24 | Stop reason: SDUPTHER

## 2021-03-06 RX ORDER — DULOXETIN HYDROCHLORIDE 20 MG/1
CAPSULE, DELAYED RELEASE ORAL
Qty: 60 CAPSULE | Refills: 0 | Status: SHIPPED | OUTPATIENT
Start: 2021-03-06 | End: 2021-05-25 | Stop reason: SDUPTHER

## 2021-03-06 RX ORDER — HYDROXYZINE 50 MG/1
TABLET, FILM COATED ORAL
Qty: 120 TABLET | Refills: 0 | Status: SHIPPED | OUTPATIENT
Start: 2021-03-06 | End: 2021-04-06

## 2021-03-12 ENCOUNTER — OFFICE VISIT (OUTPATIENT)
Dept: OBGYN CLINIC | Age: 31
End: 2021-03-12
Payer: COMMERCIAL

## 2021-03-12 VITALS
HEART RATE: 77 BPM | WEIGHT: 273 LBS | BODY MASS INDEX: 46.61 KG/M2 | HEIGHT: 64 IN | SYSTOLIC BLOOD PRESSURE: 100 MMHG | DIASTOLIC BLOOD PRESSURE: 70 MMHG

## 2021-03-12 DIAGNOSIS — N91.2 AMENORRHEA: Primary | ICD-10-CM

## 2021-03-12 DIAGNOSIS — N91.2 AMENORRHEA: ICD-10-CM

## 2021-03-12 DIAGNOSIS — R79.89 ABNORMAL TSH: ICD-10-CM

## 2021-03-12 DIAGNOSIS — Z11.3 ROUTINE SCREENING FOR STI (SEXUALLY TRANSMITTED INFECTION): ICD-10-CM

## 2021-03-12 DIAGNOSIS — Z11.51 ENCOUNTER FOR SCREENING FOR HUMAN PAPILLOMAVIRUS (HPV): ICD-10-CM

## 2021-03-12 LAB
BASOPHILS ABSOLUTE: 0.1 K/UL (ref 0–0.2)
BASOPHILS RELATIVE PERCENT: 0.6 %
BILIRUBIN URINE: NEGATIVE
BLOOD, URINE: NEGATIVE
CLARITY: CLEAR
COLOR: YELLOW
EOSINOPHILS ABSOLUTE: 0.3 K/UL (ref 0–0.7)
EOSINOPHILS RELATIVE PERCENT: 1.7 %
GLUCOSE URINE: NEGATIVE MG/DL
GONADOTROPIN, CHORIONIC (HCG) QUANT: 3724 MIU/ML
HCG, URINE, POC: POSITIVE
HCT VFR BLD CALC: 43.8 % (ref 37–47)
HEMOGLOBIN: 14.4 G/DL (ref 12–16)
HEPATITIS B SURFACE ANTIGEN INTERPRETATION: NORMAL
KETONES, URINE: NEGATIVE MG/DL
LEUKOCYTE ESTERASE, URINE: NEGATIVE
LYMPHOCYTES ABSOLUTE: 3.1 K/UL (ref 1–4.8)
LYMPHOCYTES RELATIVE PERCENT: 19.8 %
Lab: ABNORMAL
MCH RBC QN AUTO: 27.6 PG (ref 27–31.3)
MCHC RBC AUTO-ENTMCNC: 32.9 % (ref 33–37)
MCV RBC AUTO: 83.7 FL (ref 82–100)
MONOCYTES ABSOLUTE: 1 K/UL (ref 0.2–0.8)
MONOCYTES RELATIVE PERCENT: 6.4 %
NEGATIVE QC PASS/FAIL: ABNORMAL
NEUTROPHILS ABSOLUTE: 11.1 K/UL (ref 1.4–6.5)
NEUTROPHILS RELATIVE PERCENT: 71.5 %
NITRITE, URINE: NEGATIVE
PDW BLD-RTO: 13 % (ref 11.5–14.5)
PH UA: 5 (ref 5–9)
PLATELET # BLD: 374 K/UL (ref 130–400)
POSITIVE QC PASS/FAIL: ABNORMAL
PROTEIN UA: NEGATIVE MG/DL
RBC # BLD: 5.23 M/UL (ref 4.2–5.4)
RUBELLA ANTIBODY IGG: 217.2 IU/ML
SPECIFIC GRAVITY UA: 1.02 (ref 1–1.03)
T3 TOTAL: 1.41 NG/ML (ref 0.8–2)
T4 FREE: 1.07 NG/DL (ref 0.84–1.68)
TSH SERPL DL<=0.05 MIU/L-ACNC: 3.58 UIU/ML (ref 0.44–3.86)
UROBILINOGEN, URINE: 0.2 E.U./DL
WBC # BLD: 15.5 K/UL (ref 4.8–10.8)

## 2021-03-12 PROCEDURE — 99204 OFFICE O/P NEW MOD 45 MIN: CPT | Performed by: OBSTETRICS & GYNECOLOGY

## 2021-03-12 PROCEDURE — 81025 URINE PREGNANCY TEST: CPT | Performed by: OBSTETRICS & GYNECOLOGY

## 2021-03-12 RX ORDER — LANSOPRAZOLE 30 MG/1
30 CAPSULE, DELAYED RELEASE ORAL DAILY
Qty: 30 CAPSULE | Refills: 3 | Status: SHIPPED | OUTPATIENT
Start: 2021-03-12 | End: 2021-04-06

## 2021-03-12 ASSESSMENT — ENCOUNTER SYMPTOMS
SHORTNESS OF BREATH: 0
NAUSEA: 1
ABDOMINAL PAIN: 0
APNEA: 0

## 2021-03-13 LAB
ABO/RH: NORMAL
ALCOHOL URINE: NEGATIVE MG/DL
AMPHETAMINE SCREEN, URINE: NEGATIVE NG/ML
ANTIBODY SCREEN: NORMAL
BARBITURATE SCREEN URINE: NEGATIVE NG/ML
BENZODIAZEPINE SCREEN, URINE: NEGATIVE NG/ML
CANNABINOID SCREEN URINE: NEGATIVE NG/ML
CLUE CELLS: NORMAL
COCAINE METABOLITE SCREEN URINE: NEGATIVE NG/ML
CREATININE URINE: 108.4 MG/DL (ref 20–400)
HIV AG/AB: NONREACTIVE
Lab: NORMAL
MDMA URINE: NEGATIVE NG/ML
METHADONE SCREEN, URINE: NEGATIVE NG/ML
OPIATE SCREEN URINE: NEGATIVE NG/ML
OXYCODONE SCREEN URINE: NEGATIVE NG/ML
PHENCYCLIDINE SCREEN URINE: NEGATIVE NG/ML
PROPOXYPHENE SCREEN: NEGATIVE NG/ML
RPR: NORMAL
TRICHOMONAS PREP: NORMAL
TRICHOMONAS VAGINALIS SCREEN: NEGATIVE
YEAST WET PREP: NORMAL

## 2021-03-13 NOTE — PROGRESS NOTES
Subjective:      Patient ID:  Altagracia Cai is a 31 y.o. female with chief complaint of:  Chief Complaint   Patient presents with    Amenorrhea     lmp 21 last pap 20 normal       Patient presents for pregnancy confirmation . She states her last cycle was late January. March Sue has multiple health concerns.  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, abnormal tsh and her last delivery was 8 yrs ago and pt had preeclampsia requiring delivery at 35 weeks      Past Medical History:   Diagnosis Date    [facility]'s [facility]     Anti-phospholipid antibody syndrome [facility]     Anxiety 2017    Depression 2017    Disease of blood and blood forming organ     History of blood transfusion     Hypothyroidism      Past Surgical History:   Procedure Laterality Date     SECTION      GALLBLADDER SURGERY Bilateral     OVARY REMOVAL Left      Family History   Problem Relation Age of Onset    Heart Attack Mother     Heart Attack Father     Breast Cancer Neg Hx      Current Outpatient Medications on File Prior to Visit   Medication Sig Dispense Refill    DULoxetine (CYMBALTA) 20 MG extended release capsule TAKE ONE CAPSULE BY MOUTH TWO TIMES A DAY 60 capsule 0    QUEtiapine (SEROQUEL) 25 MG tablet Take 1 tablet by mouth nightly 30 tablet 3    propranolol (INDERAL) 20 MG tablet Take 1 tablet by mouth 3 times daily as needed (anxiety) 90 tablet 2    buPROPion (WELLBUTRIN XL) 150 MG extended release tablet Take 1 tablet by mouth every morning 30 tablet 3    hydrOXYzine (VISTARIL) 25 MG capsule take one capsule by mouth 4 times daily as needed for anxiety      hydrOXYzine (ATARAX) 50 MG tablet TAKE ONE TABLET BY MOUTH EVERY 6 HOURS AS NEEDED for anxiety (sleep) (Patient not taking: Reported on 3/12/2021) 120 tablet 0    [DISCONTINUED] hydrOXYzine (ATARAX) 50 MG tablet Take 1 tablet by mouth every 6 hours as needed for Anxiety (sleep) 120 tablet 2    citalopram (CELEXA) 10 MG tablet Take 0.5 tablets by mouth daily (Patient not taking: Reported on 3/12/2021) 15 tablet 1    famotidine (PEPCID) 20 MG tablet Take 1 tablet by mouth 2 times daily (Patient not taking: Reported on 3/12/2021) 60 tablet 3    [DISCONTINUED] DULoxetine (CYMBALTA) 20 MG extended release capsule Take 1 capsule by mouth 2 times daily 60 capsule 3     No current facility-administered medications on file prior to visit. Allergies:  Amoxicillin, Erythromycin, and Tequin [gatifloxacin]    Review of Systems   Constitutional: Negative for fatigue and fever. Respiratory: Negative for apnea and shortness of breath. Cardiovascular: Negative for chest pain and palpitations. Gastrointestinal: Positive for nausea (heartburn). Negative for abdominal pain. Genitourinary: Negative for difficulty urinating, dysuria, pelvic pain, vaginal bleeding and vaginal discharge. Neurological: Negative for dizziness, weakness and light-headedness. Objective:   /70 (Site: Right Upper Arm, Position: Sitting, Cuff Size: Large Adult)   Pulse 77   Ht 5' 4\" (1.626 m)   Wt 273 lb (123.8 kg)   LMP 01/27/2021 (Exact Date)   BMI 46.86 kg/m²      Physical Exam  Constitutional:       General: She is not in acute distress. Appearance: She is well-developed. She is not diaphoretic. HENT:      Head: Normocephalic. Nose: Nose normal.   Eyes:      Conjunctiva/sclera: Conjunctivae normal.      Pupils: Pupils are equal, round, and reactive to light. Neck:      Thyroid: No thyromegaly. Trachea: No tracheal deviation. Cardiovascular:      Rate and Rhythm: Normal rate and regular rhythm. Heart sounds: Normal heart sounds. No murmur. No friction rub. No gallop. Pulmonary:      Effort: Pulmonary effort is normal. No respiratory distress. Breath sounds: Normal breath sounds. No wheezing or rales. Chest:      Chest wall: No tenderness.    Abdominal:      General: Bowel sounds are normal. There is no distension. Palpations: Abdomen is soft. There is no mass. Tenderness: There is no abdominal tenderness. There is no guarding or rebound. Genitourinary:     Labia:         Right: No rash, tenderness or lesion. Left: No rash, tenderness or lesion. Vagina: Normal. No vaginal discharge, erythema, tenderness or bleeding. Cervix: No cervical motion tenderness, discharge or friability. Uterus: Not deviated, not enlarged, not fixed and not tender. Adnexa:         Right: No mass, tenderness or fullness. Left: No mass, tenderness or fullness. Comments: Uterus is not prolapsed and there is not a cystocele or rectocele noted  Musculoskeletal:      Right lower leg: No edema. Left lower leg: No edema. Skin:     General: Skin is warm and dry. Neurological:      Mental Status: She is alert and oriented to person, place, and time. Cranial Nerves: No cranial nerve deficit. Deep Tendon Reflexes: Reflexes normal.   Psychiatric:         Behavior: Behavior normal.         Judgment: Judgment normal.         Assessment:       Diagnosis Orders   1. Amenorrhea  CBC Auto Differential    Hepatitis B Surface Antigen    Herpes Simplex Virus (HSV) I Glycoprotein Antibody IgG    Herpes Simplex Virus (HSV) II Glycoprotein Antibody IgG    HIV Screen    RPR Reflex to Titer and TPPA    Rubella antibody, IgG    Type and screen    Urinalysis    Varicella Zoster Antibody, IgG    Culture, Urine    Drug Panel 9A Screen, Urine    Wet prep, genital    C.trachomatis N.gonorrhoeae DNA    US OB LESS THAN 14 WEEKS SINGLE OR FIRST GESTATION    US OB TRANSVAGINAL    HCG, Quantitative, Pregnancy    POC Pregnancy Ur-Qual   2.  Encounter for screening for human papillomavirus (HPV)  CBC Auto Differential    Hepatitis B Surface Antigen    Herpes Simplex Virus (HSV) I Glycoprotein Antibody IgG    Herpes Simplex Virus (HSV) II Glycoprotein Antibody IgG HIV Screen    RPR Reflex to Titer and TPPA    Rubella antibody, IgG    Type and screen    Urinalysis    Varicella Zoster Antibody, IgG    Culture, Urine    Drug Panel 9A Screen, Urine    Wet prep, genital    C.trachomatis N.gonorrhoeae DNA    US OB LESS THAN 14 WEEKS SINGLE OR FIRST GESTATION    US OB TRANSVAGINAL   3. Routine screening for STI (sexually transmitted infection)  CBC Auto Differential    Hepatitis B Surface Antigen    Herpes Simplex Virus (HSV) I Glycoprotein Antibody IgG    Herpes Simplex Virus (HSV) II Glycoprotein Antibody IgG    HIV Screen    RPR Reflex to Titer and TPPA    Rubella antibody, IgG    Type and screen    Urinalysis    Varicella Zoster Antibody, IgG    Culture, Urine    Drug Panel 9A Screen, Urine    Wet prep, genital    C.trachomatis N.gonorrhoeae DNA    US OB LESS THAN 14 WEEKS SINGLE OR FIRST GESTATION    US OB TRANSVAGINAL   4. Abnormal TSH  T3    T4, Free    TSH without Reflex    Anti-Thyroglobulin Antibody    Thyroid Peroxidase Antibody         Plan:      Orders Placed This Encounter   Procedures    Culture, Urine     Standing Status:   Future     Number of Occurrences:   1     Standing Expiration Date:   3/12/2022     Order Specific Question:   Specify (ex-cath, midstream, cysto, etc)?      Answer:   n/a    Wet prep, genital     Standing Status:   Future     Number of Occurrences:   1     Standing Expiration Date:   3/12/2022    C.trachomatis N.gonorrhoeae DNA     Standing Status:   Future     Number of Occurrences:   1     Standing Expiration Date:   3/12/2022    US OB LESS THAN 14 WEEKS SINGLE OR FIRST GESTATION     Standing Status:   Future     Standing Expiration Date:   3/12/2022    US OB TRANSVAGINAL     Standing Status:   Future     Standing Expiration Date:   3/12/2022    CBC Auto Differential     Standing Status:   Future     Number of Occurrences:   1     Standing Expiration Date:   3/12/2022    Hepatitis B Surface Antigen     Standing Status:   Future     Number of Occurrences:   1     Standing Expiration Date:   3/12/2022    Herpes Simplex Virus (HSV) I Glycoprotein Antibody IgG     Standing Status:   Future     Number of Occurrences:   1     Standing Expiration Date:   3/12/2022    Herpes Simplex Virus (HSV) II Glycoprotein Antibody IgG     Standing Status:   Future     Number of Occurrences:   1     Standing Expiration Date:   3/12/2022    HIV Screen     Standing Status:   Future     Number of Occurrences:   1     Standing Expiration Date:   3/12/2022    RPR Reflex to Titer and TPPA     Standing Status:   Future     Number of Occurrences:   1     Standing Expiration Date:   3/12/2022    Rubella antibody, IgG     Standing Status:   Future     Number of Occurrences:   1     Standing Expiration Date:   3/12/2022    Urinalysis     Standing Status:   Future     Number of Occurrences:   1     Standing Expiration Date:   3/12/2022    Varicella Zoster Antibody, IgG     Standing Status:   Future     Number of Occurrences:   1     Standing Expiration Date:   3/12/2022    Drug Panel 9A Screen, Urine     Standing Status:   Future     Number of Occurrences:   1     Standing Expiration Date:   3/12/2022    T3     Standing Status:   Future     Number of Occurrences:   1     Standing Expiration Date:   3/12/2022    T4, Free     Standing Status:   Future     Number of Occurrences:   1     Standing Expiration Date:   3/12/2022    TSH without Reflex     Standing Status:   Future     Number of Occurrences:   1     Standing Expiration Date:   3/12/2022    Anti-Thyroglobulin Antibody     Standing Status:   Future     Number of Occurrences:   1     Standing Expiration Date:   3/12/2022    Thyroid Peroxidase Antibody     Standing Status:   Future     Number of Occurrences:   1     Standing Expiration Date:   3/12/2022    HCG, Quantitative, Pregnancy     Standing Status:   Standing     Number of Occurrences:   3     Standing Expiration Date:   3/12/2022    POC Pregnancy Ur-Qual  Type and screen     Standing Status:   Future     Number of Occurrences:   1     Standing Expiration Date:   3/12/2022     Orders Placed This Encounter   Medications    lansoprazole (PREVACID) 30 MG delayed release capsule     Sig: Take 1 capsule by mouth daily     Dispense:  30 capsule     Refill:  3    enoxaparin (LOVENOX) 80 MG/0.8ML injection     Sig: Inject 0.8 mLs into the skin 2 times daily     Dispense:  48 mL     Refill:  5       Return in about 3 weeks (around 4/2/2021) for routine PNV.      Dee Louis DO

## 2021-03-14 LAB
HSV 1 GLYCOPROTEIN G AB IGG: 43.8 IV
HSV 2 GLYCOPROTEIN G AB IGG: 0.2 IV
URINE CULTURE, ROUTINE: NORMAL
VZV IGG SER QL IA: 2417 IV

## 2021-03-15 LAB
THYROGLOBULIN ANTIBODY: <12 IU/ML (ref 0–40)
THYROID PEROXIDASE (TPO) ABS: 27 IU/ML (ref 0–25)

## 2021-03-17 LAB
C TRACH DNA GENITAL QL NAA+PROBE: NEGATIVE
N. GONORRHOEAE DNA: NEGATIVE

## 2021-03-20 ENCOUNTER — HOSPITAL ENCOUNTER (OUTPATIENT)
Dept: ULTRASOUND IMAGING | Age: 31
Discharge: HOME | End: 2021-03-22
Payer: COMMERCIAL

## 2021-03-20 DIAGNOSIS — Z11.51 ENCOUNTER FOR SCREENING FOR HUMAN PAPILLOMAVIRUS (HPV): ICD-10-CM

## 2021-03-20 DIAGNOSIS — N91.2 AMENORRHEA: ICD-10-CM

## 2021-03-20 DIAGNOSIS — Z11.3 ROUTINE SCREENING FOR STI (SEXUALLY TRANSMITTED INFECTION): ICD-10-CM

## 2021-03-20 PROCEDURE — 76817 TRANSVAGINAL US OBSTETRIC: CPT

## 2021-03-20 PROCEDURE — 76801 OB US < 14 WKS SINGLE FETUS: CPT

## 2021-03-24 RX ORDER — ONDANSETRON 4 MG/1
4 TABLET, ORALLY DISINTEGRATING ORAL 3 TIMES DAILY PRN
Qty: 21 TABLET | Refills: 0 | Status: ON HOLD | OUTPATIENT
Start: 2021-03-24 | End: 2021-10-27 | Stop reason: HOSPADM

## 2021-04-06 ENCOUNTER — INITIAL PRENATAL (OUTPATIENT)
Dept: OBGYN CLINIC | Age: 31
End: 2021-04-06

## 2021-04-06 VITALS
HEART RATE: 80 BPM | DIASTOLIC BLOOD PRESSURE: 60 MMHG | WEIGHT: 273 LBS | SYSTOLIC BLOOD PRESSURE: 110 MMHG | BODY MASS INDEX: 46.86 KG/M2

## 2021-04-06 DIAGNOSIS — Z34.81 ENCOUNTER FOR SUPERVISION OF OTHER NORMAL PREGNANCY IN FIRST TRIMESTER: Primary | ICD-10-CM

## 2021-04-06 DIAGNOSIS — Z3A.09 9 WEEKS GESTATION OF PREGNANCY: ICD-10-CM

## 2021-04-06 PROCEDURE — 0500F INITIAL PRENATAL CARE VISIT: CPT | Performed by: OBSTETRICS & GYNECOLOGY

## 2021-04-07 ENCOUNTER — APPOINTMENT (OUTPATIENT)
Dept: ULTRASOUND IMAGING | Age: 31
End: 2021-04-07
Payer: COMMERCIAL

## 2021-04-07 ENCOUNTER — HOSPITAL ENCOUNTER (EMERGENCY)
Age: 31
Discharge: HOME | End: 2021-04-08
Payer: COMMERCIAL

## 2021-04-07 ENCOUNTER — HOSPITAL ENCOUNTER (EMERGENCY)
Age: 31
Discharge: HOME | End: 2021-04-07
Payer: COMMERCIAL

## 2021-04-07 VITALS
BODY MASS INDEX: 46.44 KG/M2 | HEIGHT: 64 IN | DIASTOLIC BLOOD PRESSURE: 87 MMHG | OXYGEN SATURATION: 99 % | HEART RATE: 76 BPM | WEIGHT: 272 LBS | SYSTOLIC BLOOD PRESSURE: 128 MMHG | TEMPERATURE: 98.3 F | RESPIRATION RATE: 18 BRPM

## 2021-04-07 VITALS
SYSTOLIC BLOOD PRESSURE: 127 MMHG | BODY MASS INDEX: 46.44 KG/M2 | HEART RATE: 66 BPM | RESPIRATION RATE: 16 BRPM | OXYGEN SATURATION: 98 % | WEIGHT: 272 LBS | DIASTOLIC BLOOD PRESSURE: 75 MMHG | TEMPERATURE: 97.9 F | HEIGHT: 64 IN

## 2021-04-07 DIAGNOSIS — O46.90 VAGINAL BLEEDING IN PREGNANCY: Primary | ICD-10-CM

## 2021-04-07 DIAGNOSIS — O26.891 ABDOMINAL PAIN DURING PREGNANCY IN FIRST TRIMESTER: ICD-10-CM

## 2021-04-07 DIAGNOSIS — O20.0 THREATENED MISCARRIAGE: ICD-10-CM

## 2021-04-07 DIAGNOSIS — R10.9 ABDOMINAL PAIN DURING PREGNANCY IN FIRST TRIMESTER: ICD-10-CM

## 2021-04-07 LAB
ABO/RH: NORMAL
ALBUMIN SERPL-MCNC: 4.4 G/DL (ref 3.5–4.6)
ALP BLD-CCNC: 63 U/L (ref 40–130)
ALT SERPL-CCNC: 34 U/L (ref 0–33)
ANION GAP SERPL CALCULATED.3IONS-SCNC: 13 MEQ/L (ref 9–15)
AST SERPL-CCNC: 23 U/L (ref 0–35)
BACTERIA: NEGATIVE /HPF
BASOPHILS ABSOLUTE: 0.1 K/UL (ref 0–0.2)
BASOPHILS RELATIVE PERCENT: 0.9 %
BILIRUB SERPL-MCNC: 0.3 MG/DL (ref 0.2–0.7)
BILIRUBIN URINE: ABNORMAL
BLOOD, URINE: NEGATIVE
BUN BLDV-MCNC: 8 MG/DL (ref 6–20)
CALCIUM SERPL-MCNC: 9.6 MG/DL (ref 8.5–9.9)
CHLORIDE BLD-SCNC: 99 MEQ/L (ref 95–107)
CHP ED QC CHECK: NORMAL
CLARITY: ABNORMAL
CO2: 22 MEQ/L (ref 20–31)
COLOR: ABNORMAL
CREAT SERPL-MCNC: 0.63 MG/DL (ref 0.5–0.9)
EOSINOPHILS ABSOLUTE: 0.2 K/UL (ref 0–0.7)
EOSINOPHILS RELATIVE PERCENT: 1.4 %
EPITHELIAL CELLS, UA: ABNORMAL /HPF (ref 0–5)
GFR AFRICAN AMERICAN: >60
GFR NON-AFRICAN AMERICAN: >60
GLOBULIN: 3.6 G/DL (ref 2.3–3.5)
GLUCOSE BLD-MCNC: 81 MG/DL (ref 70–99)
GLUCOSE URINE: NEGATIVE MG/DL
GONADOTROPIN, CHORIONIC (HCG) QUANT: NORMAL MIU/ML
HCT VFR BLD CALC: 45.7 % (ref 37–47)
HEMOGLOBIN: 15.1 G/DL (ref 12–16)
HYALINE CASTS: ABNORMAL /LPF (ref 0–5)
KETONES, URINE: 15 MG/DL
LEUKOCYTE ESTERASE, URINE: ABNORMAL
LYMPHOCYTES ABSOLUTE: 3.3 K/UL (ref 1–4.8)
LYMPHOCYTES RELATIVE PERCENT: 24.2 %
MCH RBC QN AUTO: 27.2 PG (ref 27–31.3)
MCHC RBC AUTO-ENTMCNC: 33 % (ref 33–37)
MCV RBC AUTO: 82.5 FL (ref 82–100)
MONOCYTES ABSOLUTE: 0.8 K/UL (ref 0.2–0.8)
MONOCYTES RELATIVE PERCENT: 5.5 %
NEUTROPHILS ABSOLUTE: 9.3 K/UL (ref 1.4–6.5)
NEUTROPHILS RELATIVE PERCENT: 68 %
NITRITE, URINE: NEGATIVE
PDW BLD-RTO: 12.6 % (ref 11.5–14.5)
PH UA: 5 (ref 5–9)
PLATELET # BLD: 362 K/UL (ref 130–400)
POTASSIUM SERPL-SCNC: 4.6 MEQ/L (ref 3.4–4.9)
PREGNANCY TEST URINE, POC: POSITIVE
PROTEIN UA: 30 MG/DL
RBC # BLD: 5.54 M/UL (ref 4.2–5.4)
RBC UA: ABNORMAL /HPF (ref 0–2)
SODIUM BLD-SCNC: 134 MEQ/L (ref 135–144)
SPECIFIC GRAVITY UA: 1.04 (ref 1–1.03)
TOTAL PROTEIN: 8 G/DL (ref 6.3–8)
URINE REFLEX TO CULTURE: ABNORMAL
UROBILINOGEN, URINE: 1 E.U./DL
WBC # BLD: 13.7 K/UL (ref 4.8–10.8)
WBC UA: ABNORMAL /HPF (ref 0–5)

## 2021-04-07 PROCEDURE — 99284 EMERGENCY DEPT VISIT MOD MDM: CPT

## 2021-04-07 PROCEDURE — 76801 OB US < 14 WKS SINGLE FETUS: CPT

## 2021-04-07 PROCEDURE — 86900 BLOOD TYPING SEROLOGIC ABO: CPT

## 2021-04-07 PROCEDURE — 76817 TRANSVAGINAL US OBSTETRIC: CPT

## 2021-04-07 PROCEDURE — 96374 THER/PROPH/DIAG INJ IV PUSH: CPT

## 2021-04-07 PROCEDURE — 6360000002 HC RX W HCPCS: Performed by: PHYSICIAN ASSISTANT

## 2021-04-07 PROCEDURE — 84702 CHORIONIC GONADOTROPIN TEST: CPT

## 2021-04-07 PROCEDURE — 81001 URINALYSIS AUTO W/SCOPE: CPT

## 2021-04-07 PROCEDURE — 36415 COLL VENOUS BLD VENIPUNCTURE: CPT

## 2021-04-07 PROCEDURE — 80053 COMPREHEN METABOLIC PANEL: CPT

## 2021-04-07 PROCEDURE — 99282 EMERGENCY DEPT VISIT SF MDM: CPT

## 2021-04-07 PROCEDURE — 86901 BLOOD TYPING SEROLOGIC RH(D): CPT

## 2021-04-07 PROCEDURE — 85025 COMPLETE CBC W/AUTO DIFF WBC: CPT

## 2021-04-07 RX ORDER — METOCLOPRAMIDE HYDROCHLORIDE 5 MG/ML
10 INJECTION INTRAMUSCULAR; INTRAVENOUS ONCE
Status: COMPLETED | OUTPATIENT
Start: 2021-04-07 | End: 2021-04-07

## 2021-04-07 RX ADMIN — METOCLOPRAMIDE HYDROCHLORIDE 10 MG: 5 INJECTION INTRAMUSCULAR; INTRAVENOUS at 13:00

## 2021-04-07 ASSESSMENT — ENCOUNTER SYMPTOMS
ABDOMINAL DISTENTION: 0
VOMITING: 0
COLOR CHANGE: 0
EYE DISCHARGE: 0
SHORTNESS OF BREATH: 0
SORE THROAT: 0
RHINORRHEA: 0
CONSTIPATION: 0
ABDOMINAL PAIN: 0
NAUSEA: 0

## 2021-04-07 ASSESSMENT — PAIN DESCRIPTION - FREQUENCY: FREQUENCY: INTERMITTENT

## 2021-04-07 ASSESSMENT — PAIN DESCRIPTION - ORIENTATION: ORIENTATION: LOWER

## 2021-04-07 ASSESSMENT — PAIN SCALES - GENERAL
PAINLEVEL_OUTOF10: 3
PAINLEVEL_OUTOF10: 5

## 2021-04-07 ASSESSMENT — PAIN DESCRIPTION - DESCRIPTORS
DESCRIPTORS: ACHING;CRAMPING
DESCRIPTORS: CRAMPING

## 2021-04-07 ASSESSMENT — PAIN DESCRIPTION - PAIN TYPE: TYPE: ACUTE PAIN

## 2021-04-07 ASSESSMENT — PAIN DESCRIPTION - ONSET: ONSET: ON-GOING

## 2021-04-07 NOTE — ED PROVIDER NOTES
[address] [facility] ED  eMERGENCY dEPARTMENT eNCOUnter      Pt Name: Valeria Phillips  MRN: [id number]  [place] 1990  Date of evaluation: 2021  Provider: Lon Jarvis PA-C    CHIEF COMPLAINT       Chief Complaint   Patient presents with    Vaginal Bleeding     vaginal spotting that started today. 10wks preg         HISTORY OF PRESENT ILLNESS   (Location/Symptom, Timing/Onset,Context/Setting, Quality, Duration, Modifying Factors, Severity)  Note limiting factors. Valeria Phillips is a 31 y.o. adult who presents to the emergency department complaint of vaginal bleeding which patient states started today. She states she is approximately 10 weeks pregnant. She states that she has been seen by Dr. Kristal Mercado, she has had an ultrasound previously which showed no acute problems at that time. Patient states today she has had some mild abdominal cramping in the suprapubic region, as well as some vaginal bleeding which she states is pink in color. No fevers, no acute injuries. She is rating her current pain at this time is a 3 out of 10. [id number], patient states that she was started on Lovenox approximately 3 weeks ago by her OB/GYN. HPI    NursingNotes were reviewed. REVIEW OF SYSTEMS    (2-9 systems for level 4, 10 or more for level 5)     Review of Systems   Constitutional: Negative for activity change and appetite change. HENT: Negative for congestion, ear discharge, ear pain, nosebleeds, rhinorrhea and sore throat. Eyes: Negative for discharge. Respiratory: Negative for shortness of breath. Cardiovascular: Negative for chest pain, palpitations and leg swelling. Gastrointestinal: Negative for abdominal distention, abdominal pain, constipation, nausea and vomiting. Genitourinary: Negative for difficulty urinating and dysuria. Vaginal bleeding   Musculoskeletal: Negative for arthralgias. Skin: Negative for color change.    Neurological: Negative for dizziness, syncope, numbness and headaches. Psychiatric/Behavioral: Negative for agitation and confusion. Except as noted above the remainder of the review of systems was reviewed and negative.        PAST MEDICAL HISTORY     Past Medical History:   Diagnosis Date    [facility]'s [facility]) [facility] [address]     Anti-phospholipid antibody syndrome ([facility] [address]     Anxiety 2017    Depression 2017    Disease of blood and blood forming organ     History of blood transfusion     Hypothyroidism          SURGICALHISTORY       Past Surgical History:   Procedure Laterality Date     SECTION      GALLBLADDER SURGERY Bilateral     OVARY REMOVAL Left          CURRENT MEDICATIONS       Previous Medications    BUPROPION (WELLBUTRIN XL) 150 MG EXTENDED RELEASE TABLET    Take 1 tablet by mouth every morning    DULOXETINE (CYMBALTA) 20 MG EXTENDED RELEASE CAPSULE    TAKE ONE CAPSULE BY MOUTH TWO TIMES A DAY    ENOXAPARIN (LOVENOX) 80 MG/0.8ML INJECTION    Inject 0.8 mLs into the skin 2 times daily    FAMOTIDINE (PEPCID) 20 MG TABLET    Take 1 tablet by mouth 2 times daily    HYDROXYZINE (VISTARIL) 25 MG CAPSULE    take one capsule by mouth 4 times daily as needed for anxiety    ONDANSETRON (ZOFRAN-ODT) 4 MG DISINTEGRATING TABLET    Take 1 tablet by mouth 3 times daily as needed for Nausea or Vomiting    PROPRANOLOL (INDERAL) 20 MG TABLET    Take 1 tablet by mouth 3 times daily as needed (anxiety)    QUETIAPINE (SEROQUEL) 25 MG TABLET    Take 1 tablet by mouth nightly       ALLERGIES     Amoxicillin, Erythromycin, and Tequin [gatifloxacin]    FAMILY HISTORY       Family History   Problem Relation Age of Onset    Heart Attack Mother     Heart Attack Father     Breast Cancer Neg Hx           SOCIAL HISTORY       Social History     Socioeconomic History    Marital status: Legally      Spouse name: None    Number of children: None    Years of education: None    Highest education level: None Emergency Department Physician who either signs or Co-signsthis chart in the absence of a cardiologist.        RADIOLOGY:   Ambar Latch such as CT, Ultrasound and MRI are read by the radiologist. Adela Mcpherson radiographic images are visualized and preliminarily interpreted by the emergency physician with the below findings:    Ultrasound was completed which shows single live intrauterine pregnancy at approximately 9 weeks and 1 day fetal heart rate is 169 bpm.  1 x 2 x 5 0.5 x 0.9 cm area of subchorionic hemorrhage. Interpretation per the Radiologist below, if available at the time ofthis note:    US OB LESS THAN 14 [facility]   Final Result   SINGLE LIVE INTRAUTERINE PREGNANCY, WITH ESTIMATED SONOGRAPHIC GESTATIONAL AGE 9 WEEKS 1 DAY. 1.2 X 0.5 X 0.9 CM AREA OF SUBCHORIONIC HEMORRHAGE. LEFT OVARY SURGICALLY ABSENT. PROBABLE RIGHT CORPUS LUTEAL CYSTS. US OB TRANSVAGINAL   Final Result   SINGLE LIVE INTRAUTERINE PREGNANCY, WITH ESTIMATED SONOGRAPHIC GESTATIONAL AGE 9 WEEKS 1 DAY. 1.2 X 0.5 X 0.9 CM AREA OF SUBCHORIONIC HEMORRHAGE. LEFT OVARY SURGICALLY ABSENT. PROBABLE RIGHT CORPUS LUTEAL CYSTS.             ED BEDSIDE ULTRASOUND:   Performed by ED Physician - none    LABS:  Labs Reviewed   COMPREHENSIVE METABOLIC PANEL - Abnormal; Notable for the following components:       Result Value    Sodium 134 (*)     ALT 34 (*)     Globulin 3.6 (*)     All other components within normal limits   CBC WITH AUTO DIFFERENTIAL - Abnormal; Notable for the following components:    WBC 13.7 (*)     RBC 5.54 (*)     Neutrophils Absolute 9.3 (*)     All other components within normal limits   URINE RT REFLEX TO CULTURE - Abnormal; Notable for the following components:    Color, UA DARK YELLOW (*)     Clarity, UA CLOUDY (*)     Bilirubin Urine MODERATE (*)     Ketones, Urine 15 (*)     Protein, UA 30 (*)     Leukocyte Esterase, Urine TRACE (*)     All other components within normal limits   MICROSCOPIC URINALYSIS - Abnormal; Notable for the following components:    WBC, UA 6-9 (*)     All other components within normal limits   POCT URINE PREGNANCY - Normal   HCG, QUANTITATIVE, PREGNANCY   ABO/RH       All other labs were within normal range or not returned as of this dictation. EMERGENCY DEPARTMENT COURSE and DIFFERENTIAL DIAGNOSIS/MDM:   Vitals:    Vitals:    04/07/21 1207 04/07/21 1332   BP: 134/86 130/80   Pulse: 72 70   Resp: 17 18   Temp: 97.9 °F (36.6 °C)    TempSrc: Temporal    SpO2: 98% 98%   Weight: 272 lb (123.4 kg)    Height: 5' 4\" (1.626 m)           MDM  Number of Diagnoses or Management Options  Threatened miscarriage  Vaginal bleeding in pregnancy  Diagnosis management comments: Sent presents emergency department with complaint of vaginal bleeding, which she states started this morning for her. She is approximately 9 weeks pregnant. She states that she has had care has been followed by OB/GYN Dr. Lukasz Davis. Has not had any complications or problems up to this point. She states she was concerned about the bleeding this morning so she decided come to the emergency department. She has mild abdominal cramping, no nausea vomiting, no fevers, no urinary complaints, she denies any acute injury. Ultrasound was completed which does show single live intrauterine pregnancy at approximately 9 weeks and 1 day, fetal heart rate is approximate 169 bpm.  There is a small area of subchorionic hemorrhage. Patient was advised close follow-up with her OB/GYN in next 2 to 3 days. She was also advised if she has any worsening or change in bleeding or abdominal pain or cramping, she should return to the ER for reevaluation. CRITICAL CARE TIME   Total Critical Care time was 0 minutes, excluding separately reportableprocedures.   There was a high probability of clinicallysignificant/life threatening deterioration in the patient's condition which required my urgent intervention. CONSULTS:  None    PROCEDURES:  Unless otherwise noted below, none     Procedures    FINAL IMPRESSION      1. Vaginal bleeding in pregnancy    2.  Threatened miscarriage          DISPOSITION/PLAN   DISPOSITION Decision To Discharge 04/07/2021 02:56:10 PM      PATIENT REFERRED TO:  Sue Marshall PA-C  [id number] [place] [address]  [phone]    In 3 days      Piedad Solis DO  [place] [facility] [address]  [phone]    In 2 days        DISCHARGE MEDICATIONS:  New Prescriptions    No medications on file          (Please note that portions of this note were completed with a voice recognition program.  Efforts were made to edit the dictations but occasionally words are mis-transcribed.)    Kya Paulino PA-C (electronically signed)  Attending Emergency Physician         Kya Paulino PA-C  04/07/21 1500

## 2021-04-08 ENCOUNTER — OFFICE VISIT (OUTPATIENT)
Dept: OBGYN CLINIC | Age: 31
End: 2021-04-08

## 2021-04-08 VITALS
WEIGHT: 271 LBS | DIASTOLIC BLOOD PRESSURE: 80 MMHG | BODY MASS INDEX: 46.26 KG/M2 | HEART RATE: 80 BPM | HEIGHT: 64 IN | SYSTOLIC BLOOD PRESSURE: 100 MMHG

## 2021-04-08 DIAGNOSIS — Z3A.09 9 WEEKS GESTATION OF PREGNANCY: ICD-10-CM

## 2021-04-08 DIAGNOSIS — Z34.91 FETAL HEART TONES PRESENT, FIRST TRIMESTER: ICD-10-CM

## 2021-04-08 DIAGNOSIS — O46.8X1 SUBCHORIONIC HEMORRHAGE OF PLACENTA IN FIRST TRIMESTER, SINGLE OR UNSPECIFIED FETUS: Primary | ICD-10-CM

## 2021-04-08 DIAGNOSIS — O41.8X10 SUBCHORIONIC HEMORRHAGE OF PLACENTA IN FIRST TRIMESTER, SINGLE OR UNSPECIFIED FETUS: Primary | ICD-10-CM

## 2021-04-08 LAB
ALBUMIN SERPL-MCNC: 4.4 G/DL (ref 3.5–4.6)
ALP BLD-CCNC: 57 U/L (ref 40–130)
ALT SERPL-CCNC: 29 U/L (ref 0–33)
ANION GAP SERPL CALCULATED.3IONS-SCNC: 12 MEQ/L (ref 9–15)
APTT: 36.7 SEC (ref 24.4–36.8)
AST SERPL-CCNC: 15 U/L (ref 0–35)
BASOPHILS ABSOLUTE: 0.1 K/UL (ref 0–0.2)
BASOPHILS RELATIVE PERCENT: 0.8 %
BILIRUB SERPL-MCNC: 0.3 MG/DL (ref 0.2–0.7)
BUN BLDV-MCNC: 11 MG/DL (ref 6–20)
CALCIUM SERPL-MCNC: 9.1 MG/DL (ref 8.5–9.9)
CHLORIDE BLD-SCNC: 100 MEQ/L (ref 95–107)
CO2: 25 MEQ/L (ref 20–31)
CREAT SERPL-MCNC: 0.51 MG/DL (ref 0.5–0.9)
EOSINOPHILS ABSOLUTE: 0.2 K/UL (ref 0–0.7)
EOSINOPHILS RELATIVE PERCENT: 1.1 %
GFR AFRICAN AMERICAN: >60
GFR NON-AFRICAN AMERICAN: >60
GLOBULIN: 3.2 G/DL (ref 2.3–3.5)
GLUCOSE BLD-MCNC: 102 MG/DL (ref 70–99)
HCT VFR BLD CALC: 41.8 % (ref 37–47)
HEMOGLOBIN: 13.9 G/DL (ref 12–16)
INR BLD: 1
LYMPHOCYTES ABSOLUTE: 3.9 K/UL (ref 1–4.8)
LYMPHOCYTES RELATIVE PERCENT: 27 %
MCH RBC QN AUTO: 27.1 PG (ref 27–31.3)
MCHC RBC AUTO-ENTMCNC: 33.3 % (ref 33–37)
MCV RBC AUTO: 81.5 FL (ref 82–100)
MONOCYTES ABSOLUTE: 0.9 K/UL (ref 0.2–0.8)
MONOCYTES RELATIVE PERCENT: 6.2 %
NEUTROPHILS ABSOLUTE: 9.3 K/UL (ref 1.4–6.5)
NEUTROPHILS RELATIVE PERCENT: 64.9 %
PDW BLD-RTO: 12.7 % (ref 11.5–14.5)
PLATELET # BLD: 340 K/UL (ref 130–400)
POTASSIUM SERPL-SCNC: 3.9 MEQ/L (ref 3.4–4.9)
PROTHROMBIN TIME: 13.4 SEC (ref 12.3–14.9)
RBC # BLD: 5.13 M/UL (ref 4.2–5.4)
SODIUM BLD-SCNC: 137 MEQ/L (ref 135–144)
TOTAL PROTEIN: 7.6 G/DL (ref 6.3–8)
WBC # BLD: 14.4 K/UL (ref 4.8–10.8)

## 2021-04-08 PROCEDURE — 85610 PROTHROMBIN TIME: CPT

## 2021-04-08 PROCEDURE — 0500F INITIAL PRENATAL CARE VISIT: CPT | Performed by: OBSTETRICS & GYNECOLOGY

## 2021-04-08 PROCEDURE — 85025 COMPLETE CBC W/AUTO DIFF WBC: CPT

## 2021-04-08 PROCEDURE — 36415 COLL VENOUS BLD VENIPUNCTURE: CPT

## 2021-04-08 PROCEDURE — 85730 THROMBOPLASTIN TIME PARTIAL: CPT

## 2021-04-08 PROCEDURE — 80053 COMPREHEN METABOLIC PANEL: CPT

## 2021-04-08 RX ORDER — ACETAMINOPHEN 325 MG/1
650 TABLET ORAL EVERY 6 HOURS PRN
Qty: 40 TABLET | Refills: 0 | Status: ON HOLD | OUTPATIENT
Start: 2021-04-08 | End: 2021-10-27 | Stop reason: HOSPADM

## 2021-04-08 ASSESSMENT — ENCOUNTER SYMPTOMS
APNEA: 0
EYE PAIN: 0
ALLERGIC/IMMUNOLOGIC NEGATIVE: 1
ABDOMINAL PAIN: 1
COLOR CHANGE: 0
SHORTNESS OF BREATH: 0
TROUBLE SWALLOWING: 0

## 2021-04-08 NOTE — ED NOTES
Discharge instructions explained to patient who voices understanding. Patient is ambulatory from ED with no distress observed.        Esther Mena RN  04/08/21 0157

## 2021-04-08 NOTE — ED TRIAGE NOTES
Pt came to the ED for vaginal bleeding and cramping. Pt states she was seen in this ED earlier today for the same issue but was told to come back if the cramping continues. Pt states she took tylenol around 6pm tonight with minimal relief. Pt described the vaginal bleeding as \"bright pink\" and denies any clots.

## 2021-04-08 NOTE — ED PROVIDER NOTES
[address] [facility] ED  EMERGENCY DEPARTMENT ENCOUNTER      Pt Name: Harmeet Pimentel  MRN: [id number]  [place] 1990  Date of evaluation: 4/7/2021  Provider: Imer Garcia PA-C    CHIEF COMPLAINT       Chief Complaint   Patient presents with    Vaginal Bleeding     with cramping; pt states she was seen in this ED earlier today         HISTORY OF PRESENT ILLNESS   (Location/Symptom, Timing/Onset, Context/Setting, Quality, Duration, Modifying Factors, Severity)  Note limiting factors. Harmeet Pimentel is a 31 y.o. adult who presents to the emergency department with complaints of vaginal bleeding and abdominal cramping. Patient was seen in the emergency department earlier today for the same complaints and had an ultrasound as she is G3, P1 Ab1 and approximately 10 to 11 weeks pregnant. Patient states that the bleeding had stopped but did recur again this evening. Patient states that the abdominal cramping is getting worse and spreading to the upper abdomen. Patient denies any hematuria or dysuria. Denies the passage of any clots    HPI    Nursing Notes were reviewed. REVIEW OF SYSTEMS    (2-9 systems for level 4, 10 or more for level 5)     Review of Systems   Constitutional: Negative for diaphoresis and fever. HENT: Negative for hearing loss and trouble swallowing. Eyes: Negative for pain. Respiratory: Negative for apnea and shortness of breath. Cardiovascular: Negative for chest pain. Gastrointestinal: Positive for abdominal pain. Endocrine: Negative. Genitourinary: Negative for dysuria and hematuria. Musculoskeletal: Negative for neck pain and neck stiffness. Skin: Negative for color change. Allergic/Immunologic: Negative. Neurological: Negative for dizziness and numbness. Hematological: Negative. Psychiatric/Behavioral: Negative. All other systems reviewed and are negative. Except as noted above the remainder of the review of systems was reviewed and negative. PAST MEDICAL HISTORY     Past Medical History:   Diagnosis Date    Adrenal insufficiency ([place]'s disease) ([facility] [address]     Anti-phospholipid antibody syndrome ([facility] [address]     Anxiety 2017    Depression 2017    Disease of blood and blood forming organ     History of blood transfusion     Hypothyroidism          SURGICAL HISTORY       Past Surgical History:   Procedure Laterality Date     SECTION      GALLBLADDER SURGERY Bilateral     OVARY REMOVAL Left 2003         CURRENT MEDICATIONS       Previous Medications    BUPROPION (WELLBUTRIN XL) 150 MG EXTENDED RELEASE TABLET    Take 1 tablet by mouth every morning    DULOXETINE (CYMBALTA) 20 MG EXTENDED RELEASE CAPSULE    TAKE ONE CAPSULE BY MOUTH TWO TIMES A DAY    ENOXAPARIN (LOVENOX) 80 MG/0.8ML INJECTION    Inject 0.8 mLs into the skin 2 times daily    FAMOTIDINE (PEPCID) 20 MG TABLET    Take 1 tablet by mouth 2 times daily    HYDROXYZINE (VISTARIL) 25 MG CAPSULE    take one capsule by mouth 4 times daily as needed for anxiety    ONDANSETRON (ZOFRAN-ODT) 4 MG DISINTEGRATING TABLET    Take 1 tablet by mouth 3 times daily as needed for Nausea or Vomiting    PROPRANOLOL (INDERAL) 20 MG TABLET    Take 1 tablet by mouth 3 times daily as needed (anxiety)    QUETIAPINE (SEROQUEL) 25 MG TABLET    Take 1 tablet by mouth nightly       ALLERGIES     Amoxicillin, Erythromycin, and Tequin [gatifloxacin]    FAMILY HISTORY       Family History   Problem Relation Age of Onset    Heart Attack Mother     Heart Attack Father     Breast Cancer Neg Hx           SOCIAL HISTORY       Social History     Socioeconomic History    Marital status: Legally      Spouse name: Not on file    Number of children: Not on file    Years of education: Not on file    Highest education level: Not on file   Occupational History    Not on file   Social Needs    Financial resource strain: Not on file    Food insecurity     Worry: Not on file     Inability: Not on file  Transportation needs     Medical: Not on file     Non-medical: Not on file   Tobacco Use    Smoking status: Former Smoker     Types: Cigarettes     Quit date: 7/3/2015     Years since quittin.7    Smokeless tobacco: Never Used   Substance and Sexual Activity    Alcohol use: No    Drug use: No    Sexual activity: Yes     Partners: Male, Female   Lifestyle    Physical activity     Days per week: Not on file     Minutes per session: Not on file    Stress: Not on file   Relationships    Social connections     Talks on phone: Not on file     Gets together: Not on file     Attends [employer] service: Not on file     Active member of club or organization: Not on file     Attends meetings of clubs or organizations: Not on file     Relationship status: Not on file    Intimate partner violence     Fear of current or ex partner: Not on file     Emotionally abused: Not on file     Physically abused: Not on file     Forced sexual activity: Not on file   Other Topics Concern    Not on file   Social History Narrative    Not on file       SCREENINGS                        PHYSICAL EXAM    (up to 7 for level 4, 8 or more for level 5)     ED Triage Vitals [21 2305]   BP Temp Temp src Pulse Resp SpO2 Height Weight   128/87 98.3 °F (36.8 °C) -- 76 18 99 % 5' 4\" (1.626 m) 272 lb (123.4 kg)       Physical Exam  Vitals signs and nursing note reviewed. Constitutional:       General: Thais Sandhu is not in acute distress. Appearance: Thais Sandhu is well-developed. HENT:      Head: Normocephalic and atraumatic. Nose: Nose normal.   Eyes:      General: No scleral icterus. Pupils: Pupils are equal, round, and reactive to light. Neck:      Musculoskeletal: Normal range of motion and neck supple. Trachea: No tracheal deviation. Cardiovascular:      Rate and Rhythm: Normal rate and regular rhythm. Heart sounds: Normal heart sounds. No murmur.    Pulmonary:      Effort: Pulmonary effort is normal. No respiratory distress. Breath sounds: Normal breath sounds. No wheezing or rales. Abdominal:      General: Bowel sounds are normal. There is no distension. Palpations: Abdomen is soft. Tenderness: There is abdominal tenderness. There is no guarding. Musculoskeletal: Normal range of motion. Skin:     General: Skin is warm and dry. Findings: No erythema or rash. Neurological:      Mental Status: Blas Sow is alert and oriented to person, place, and time. Deep Tendon Reflexes: Reflexes are normal and symmetric. Psychiatric:         Behavior: Behavior normal.         Thought Content: Thought content normal.         Judgment: Judgment normal.         DIAGNOSTIC RESULTS     EKG: All EKG's are interpreted by the Emergency Department Physician who either signs or Co-signs this chart in the absence of a cardiologist.        RADIOLOGY:   Non-plain film images such as CT, Ultrasound and MRI are read by the radiologist. Plain radiographic images are visualized and preliminarily interpreted by the emergency physician with the below findings:        Interpretation per the Radiologist below, if available at the time of this note:    No orders to display         ED BEDSIDE ULTRASOUND:   Performed by ED Physician - none    LABS:  Labs Reviewed   CBC WITH AUTO DIFFERENTIAL - Abnormal; Notable for the following components:       Result Value    WBC 14.4 (*)     MCV 81.5 (*)     Neutrophils Absolute 9.3 (*)     Monocytes Absolute 0.9 (*)     All other components within normal limits   COMPREHENSIVE METABOLIC PANEL - Abnormal; Notable for the following components:    Glucose 102 (*)     All other components within normal limits   PROTIME-INR   APTT       All other labs were within normal range or not returned as of this dictation.     EMERGENCY DEPARTMENT COURSE and DIFFERENTIAL DIAGNOSIS/MDM:   Vitals:    Vitals:    04/07/21 2305   BP: 128/87   Pulse: 76   Resp: 18   Temp: 98.3 °F (36.8 °C)   SpO2: 99%   Weight: 272 lb (123.4 kg)   Height: 5' 4\" (1.626 m)         MDM      REASSESSMENT      Patient presented the emergency department for reevaluation of abdominal pain and vaginal bleeding. Patient is in no distress and not passing any clots. Hemoglobin is stable with a hemoglobin of 13.9. She will be discharged home with OB follow-up tomorrow as scheduled      CONSULTS:  None    PROCEDURES:  Unless otherwise noted below, none     Procedures        FINAL IMPRESSION      1. Vaginal bleeding in pregnancy    2. Abdominal pain during pregnancy in first trimester          DISPOSITION/PLAN   DISPOSITION Decision To Discharge 04/08/2021 01:46:43 AM      PATIENT REFERRED TO:  Mita Lewis DO  [address] [place] [address]  [phone]    Call in 1 day        DISCHARGE MEDICATIONS:  New Prescriptions    ACETAMINOPHEN (AMINOFEN) 325 MG TABLET    Take 2 tablets by mouth every 6 hours as needed for Pain     Controlled Substances Monitoring:     No flowsheet data found.     (Please note that portions of this note were completed with a voice recognition program.  Efforts were made to edit the dictations but occasionally words are mis-transcribed.)    Imer Garcia PA-C (electronically signed)  Attending Emergency Physician            Imer Garcia PA-C  04/08/21 0151

## 2021-04-09 NOTE — PROGRESS NOTES
Subjective:      Patient ID:  Silvana Denson is a 31 y.o. adult with chief complaint of:  Chief Complaint   Patient presents with    Follow-up     er f/u for bleeding        Patient presents after ED due to bleeding. Patient is in first trimester and a subchorionic hemorrhage. Patient currently without bleeding no pain.        Past Medical History:   Diagnosis Date    Adrenal insufficiency ([place]'s disease) ([facility] [address]     Anti-phospholipid antibody syndrome ([facility] [address]     Anxiety 2017    Depression 2017    Disease of blood and blood forming organ     History of blood transfusion     Hypothyroidism      Past Surgical History:   Procedure Laterality Date     SECTION      GALLBLADDER SURGERY Bilateral 2015    OVARY REMOVAL Left      Family History   Problem Relation Age of Onset    Heart Attack Mother     Heart Attack Father     Breast Cancer Neg Hx      Current Outpatient Medications on File Prior to Visit   Medication Sig Dispense Refill    acetaminophen (AMINOFEN) 325 MG tablet Take 2 tablets by mouth every 6 hours as needed for Pain 40 tablet 0    ondansetron (ZOFRAN-ODT) 4 MG disintegrating tablet Take 1 tablet by mouth 3 times daily as needed for Nausea or Vomiting (Patient not taking: Reported on 2021) 21 tablet 0    enoxaparin (LOVENOX) 80 MG/0.8ML injection Inject 0.8 mLs into the skin 2 times daily 48 mL 5    DULoxetine (CYMBALTA) 20 MG extended release capsule TAKE ONE CAPSULE BY MOUTH TWO TIMES A DAY 60 capsule 0    QUEtiapine (SEROQUEL) 25 MG tablet Take 1 tablet by mouth nightly 30 tablet 3    [DISCONTINUED] hydrOXYzine (ATARAX) 50 MG tablet Take 1 tablet by mouth every 6 hours as needed for Anxiety (sleep) 120 tablet 2    propranolol (INDERAL) 20 MG tablet Take 1 tablet by mouth 3 times daily as needed (anxiety) 90 tablet 2    buPROPion (WELLBUTRIN XL) 150 MG extended release tablet Take 1 tablet by mouth every morning 30 tablet 3    famotidine (PEPCID) 20 MG tablet Take 1 tablet by mouth 2 times daily 60 tablet 3    [DISCONTINUED] DULoxetine (CYMBALTA) 20 MG extended release capsule Take 1 capsule by mouth 2 times daily 60 capsule 3    hydrOXYzine (VISTARIL) 25 MG capsule take one capsule by mouth 4 times daily as needed for anxiety       No current facility-administered medications on file prior to visit. Allergies:  Amoxicillin, Erythromycin, and Tequin [gatifloxacin]    Review of Systems   Constitutional: Negative for fatigue and fever. Respiratory: Negative for apnea and shortness of breath. Cardiovascular: Negative for chest pain and palpitations. Gastrointestinal: Negative for abdominal pain. Genitourinary: Negative for difficulty urinating and dysuria. Neurological: Negative for dizziness, weakness and light-headedness. Psychiatric/Behavioral: Negative for agitation and dysphoric mood. Objective:   /80 (Site: Right Upper Arm, Position: Sitting, Cuff Size: Large Adult)   Pulse 80   Ht 5' 4\" (1.626 m)   Wt 271 lb (122.9 kg)   LMP 01/27/2021 (Exact Date)   BMI 46.52 kg/m²      Physical Exam  Constitutional:       Appearance: Normal appearance. Abdominal:      General: Abdomen is flat. Palpations: Abdomen is soft. Genitourinary:     Labia:         Right: No rash, tenderness or lesion. Left: No rash, tenderness or lesion. Vagina: No erythema, tenderness or bleeding. Cervix: No friability or cervical bleeding. Uterus: Not enlarged and not tender. Neurological:      Mental Status: Armando Francisco is alert and oriented to person, place, and time. Psychiatric:         Mood and Affect: Mood normal.         Behavior: Behavior normal.         Assessment:       Diagnosis Orders   1. Subchorionic hemorrhage of placenta in first trimester, single or unspecified fetus     2. 9 weeks gestation of pregnancy     3.  Fetal heart tones present, first trimester           Plan:      No bleeding currently doing well reassured. continue pelvic rest at this time  No orders of the defined types were placed in this encounter. No orders of the defined types were placed in this encounter. No follow-ups on file.      Presley Fink,

## 2021-04-11 ASSESSMENT — ENCOUNTER SYMPTOMS
ABDOMINAL PAIN: 0
APNEA: 0
SHORTNESS OF BREATH: 0

## 2021-04-11 NOTE — PROGRESS NOTES
Initial OB visit      Claudeen Alas is a 31y.o. year old female  @ 9.6 weeks presents for continuation of prenatal care. 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            Adrenal insufficiency            Hypothyroidism             History of transfusion     PSH: ltcs, cholecystectomy, ovarian cystectomy    MEDS: synthroid, lovenox    Drug Allergies: nkda    SOCHX: neg times 3    FH: Mother or Father , DM Yes , HTN Yes, Other Yes    /60   Pulse 80   Wt 273 lb (123.8 kg)   LMP 2021 (Exact Date)   BMI 46.86 kg/m²   Past Medical History:   Diagnosis Date    Adrenal insufficiency ([place]'s disease) ([facility] [address]     Anti-phospholipid antibody syndrome ([facility] [address]     Anxiety 2017    Depression 2017    Disease of blood and blood forming organ     History of blood transfusion     Hypothyroidism      Past Surgical History:   Procedure Laterality Date     SECTION      GALLBLADDER SURGERY Bilateral     OVARY REMOVAL Left          Review of Systems  Constitutional: negative for anorexia, chills and fatigue  Genitourinary:negative for abnormal menstrual periods and genital lesions, dysuria and frequency, see above  Integument/breast: negative for dryness and pruritus  Behavioral/Psych: negative for abusive relationship, aggressive behavior and anxiety  Endocrine: negative     All other systems reviewed and are negative. Physical Exam:  Skin: Warm, dry, no lesions or rashes  Extremities: Without clubbing, cyanosis and edema. Palms and nails are normal. Ambulates without difficulty  Neurological: No gross sensory or motor deficits.   Abdomen: Soft, non-tender without masses or organomegaly  bowel sounds normoactive  External Genitalia: Normal anatomy, no lesions or masses  Pubic Hair Distribution: Normal pattern and distribution  Pelvic Floor: Normal pelvic support, no significant cystocele or rectocele  Perineum: No fissures, lesions or leukoplakia  Urethra: Normal  Vagina: Moist, pink, supple, no lesions, no abnormal discharge  Cervix: Firm, nontender, no lesions  Uterus: firm, mobile, nontender, no masses or irregularities  Adnexa: Normal; No masses or tenderness bilaterally      Assessment:   1. Encounter for supervision of other normal pregnancy in first trimester    2. 9 weeks gestation of pregnancy        Plan:   Orders Placed This Encounter   Procedures    Prenatal Testing for Fetal Aneuploidy     Standing Status:   Future     Standing Expiration Date:   2022      No orders of the defined types were placed in this encounter. Plan:   DO Israel Al 2021  Patient's last menstrual period was 2021 (exact date). INITIAL OBSTETRICAL VISIT EVALUATION:  The patient was seen full history and physical was completed/reviewed. Cytology was collected for patients over 24years of age. Cultures were collected. The patient was counseled on office policies and she was counseled on termination of pregnancy in the state of [place]. The patient was counseled on Toxoplasmosis, HIV, Tobacco Abuse, Group Beta Strep Infections, Cystic Fibrosis,  Labor precautions and Sickle Cell disease. The patient was counseled on the risks of tobacco abuse. Both maternal and fetal. She was instructed to stop smoking if currently using tobacco. Morbidity, mortality, and cessation programs were reviewed. The risks include but are not limited to increased risks of  labor,  delivery, premature rupture of membranes, intrauterine growth restriction, intrauterine fetal demise and abruptio placenta. Secondary smoke risks were also reviewed. Increases in cancer, respiratory problems, and sudden infant death syndrome were reviewed as well. The patient was informed of a 2-4% risk of congenital anomalies in the general population.  She was also informed that karyotyping is the only way to evaluate the fetus for genetic problems and genetic lethal anomalies. Chorionic villous sampling, amniocentesis and Maternal Genetic Blood Sampling-(NIPT Testing) were also discussed with morbidity rates in detail. She requested any of the options. Route of delivery and counseling on vaginal, operative vaginal, and  sections were completed with the risks of each to both the patient as well as her baby. The possibility of a blood transfusion was discussed as well. The patient was not opposed to receiving a transfusion if needed. First trimester screening and MSAFP single marker testing was reviewed in detail with attention to timing of testing and their windows. A second trimester amniocentesis with MFM consults is also made available to the patient with significant risks. Risks, Benefits and non-invasive alternative testing was reviewed. The patient was questioned in detail regarding any genetic misnomer history, chromosomal abnormalities, or learning disabilities in  herself, the father of the baby or their families. SHE DENIED ANY HISTORY AS STATED ABOVE: Yes    Upon completion of the visit all questions were answered and the patients follow-up and testing schedule were reviewed. Prenatal vitamins were given if necessary. What to expect from visits and care discussed     Initial labs reviewed  Prenatal vitamins. Problem list reviewed and updated.   Role of ultrasound in pregnancy discussed  Follow-up in 4 weeks  Will have MFM referral for comanagment

## 2021-04-23 DIAGNOSIS — N91.2 AMENORRHEA: ICD-10-CM

## 2021-04-23 DIAGNOSIS — Z3A.09 9 WEEKS GESTATION OF PREGNANCY: ICD-10-CM

## 2021-04-23 DIAGNOSIS — Z34.81 ENCOUNTER FOR SUPERVISION OF OTHER NORMAL PREGNANCY IN FIRST TRIMESTER: ICD-10-CM

## 2021-04-23 LAB — GONADOTROPIN, CHORIONIC (HCG) QUANT: NORMAL MIU/ML

## 2021-05-02 LAB
EER NON INVASIVE PRENATAL ANEUPLOIDY: NORMAL
FETAL FRACTION: 2.2
FETAL GENDER: NORMAL
FETUS COUNT: NORMAL
GESTATIONAL AGE (DAYS): 3
GESTATIONAL AGE(WEEKS): 11
HEIGHT: 5.4
Lab: NORMAL
MATERNAL WEIGHT: 271
MONOSOMY X: NORMAL
REPORT FETUS GENDER: YES
TRIPLOIDY (VANISHING TWIN): NORMAL
TRISOMY 13 RISK: NORMAL
TRISOMY 18 RISK ASSESSMENT: NORMAL
TRISOMY 21 RISK: NORMAL

## 2021-05-04 ENCOUNTER — ROUTINE PRENATAL (OUTPATIENT)
Dept: OBGYN CLINIC | Age: 31
End: 2021-05-04

## 2021-05-04 VITALS
WEIGHT: 273 LBS | BODY MASS INDEX: 46.86 KG/M2 | HEART RATE: 96 BPM | SYSTOLIC BLOOD PRESSURE: 116 MMHG | DIASTOLIC BLOOD PRESSURE: 84 MMHG

## 2021-05-04 DIAGNOSIS — O99.119 ANTIPHOSPHOLIPID SYNDROME IN PREGNANCY (HCC): ICD-10-CM

## 2021-05-04 DIAGNOSIS — O99.282 HYPOTHYROID IN PREGNANCY, ANTEPARTUM, SECOND TRIMESTER: ICD-10-CM

## 2021-05-04 DIAGNOSIS — O09.92 HIGH-RISK PREGNANCY IN SECOND TRIMESTER: ICD-10-CM

## 2021-05-04 DIAGNOSIS — Z3A.13 13 WEEKS GESTATION OF PREGNANCY: Primary | ICD-10-CM

## 2021-05-04 DIAGNOSIS — E03.9 HYPOTHYROID IN PREGNANCY, ANTEPARTUM, SECOND TRIMESTER: ICD-10-CM

## 2021-05-04 DIAGNOSIS — Z3A.13 13 WEEKS GESTATION OF PREGNANCY: ICD-10-CM

## 2021-05-04 DIAGNOSIS — D68.61 ANTIPHOSPHOLIPID SYNDROME IN PREGNANCY (HCC): ICD-10-CM

## 2021-05-04 DIAGNOSIS — Z34.82 ENCOUNTER FOR SUPERVISION OF OTHER NORMAL PREGNANCY IN SECOND TRIMESTER: ICD-10-CM

## 2021-05-04 PROCEDURE — 0502F SUBSEQUENT PRENATAL CARE: CPT | Performed by: OBSTETRICS & GYNECOLOGY

## 2021-05-04 RX ORDER — PROMETHAZINE HYDROCHLORIDE 12.5 MG/1
12.5 SUPPOSITORY RECTAL EVERY 6 HOURS PRN
Qty: 30 SUPPOSITORY | Refills: 3 | Status: SHIPPED | OUTPATIENT
Start: 2021-05-04 | End: 2021-05-18

## 2021-05-04 NOTE — PROGRESS NOTES
Patient's last menstrual period was 01/27/2021 (exact date). Please reference prenatal and OB flow chart for further information  PT here today for routine prenatal care  Pt endorses no fetal movement and denies loss of fluid, contractions or vaginal bleeding  Pt without complaints  ROS:  Pt denies headache, dysuria,  Positive nausea/vomiting however able to hold foods  PE:  /84   Pulse 96   Wt 273 lb (123.8 kg)   LMP 01/27/2021 (Exact Date)   BMI 46.86 kg/m²   Gen - Alert and oriented x 3  HEENT- NC/AT, CVS - RRR, Lungs - CTAB  Abd - FH Appropriate fetal growth  LE no edema  Reassuring fetal status at this time     Diagnosis Orders   1. 13 weeks gestation of pregnancy  Prenatal Testing for Fetal Aneuploidy   2. High-risk pregnancy in second trimester     3. Antiphospholipid syndrome in pregnancy ([address]     4. Hypothyroid in pregnancy, antepartum, second trimester         Upon completion of the visit all questions were answered and the patients follow-up and testing schedule were reviewed.    will follow up on MFM consult doing well with lovenox

## 2021-05-11 RX ORDER — QUETIAPINE FUMARATE 25 MG/1
25 TABLET, FILM COATED ORAL NIGHTLY
Qty: 30 TABLET | Refills: 1 | Status: SHIPPED | OUTPATIENT
Start: 2021-05-11 | End: 2021-05-25

## 2021-05-17 ENCOUNTER — PATIENT MESSAGE (OUTPATIENT)
Dept: BEHAVIORAL/MENTAL HEALTH CLINIC | Age: 31
End: 2021-05-17

## 2021-05-17 NOTE — TELEPHONE ENCOUNTER
From: Joe Ibrahim  To: Yvan Pollard MD  Sent: 5/17/2021 9:07 AM EDT  Subject: Prescription Question    Hello, would it be possible to get a refill for my propranolol prescription?

## 2021-05-18 LAB
EER NON INVASIVE PRENATAL ANEUPLOIDY: NORMAL
FETAL FRACTION: 2.9
FETAL GENDER: NORMAL
FETUS COUNT: 1
GESTATIONAL AGE (DAYS): 6
GESTATIONAL AGE(WEEKS): 13
HEIGHT: NORMAL
Lab: NORMAL
MATERNAL WEIGHT: 273
MONOSOMY X: NORMAL
REPORT FETUS GENDER: YES
TRIPLOIDY (VANISHING TWIN): NORMAL
TRISOMY 13 RISK: NORMAL
TRISOMY 18 RISK ASSESSMENT: NORMAL
TRISOMY 21 RISK: NORMAL

## 2021-05-21 RX ORDER — PROPRANOLOL HYDROCHLORIDE 20 MG/1
20 TABLET ORAL 3 TIMES DAILY PRN
Qty: 90 TABLET | Refills: 2 | Status: SHIPPED | OUTPATIENT
Start: 2021-05-21 | End: 2021-05-25

## 2021-05-25 ENCOUNTER — OFFICE VISIT (OUTPATIENT)
Dept: BEHAVIORAL/MENTAL HEALTH CLINIC | Age: 31
End: 2021-05-25
Payer: COMMERCIAL

## 2021-05-25 DIAGNOSIS — F33.1 MODERATE EPISODE OF RECURRENT MAJOR DEPRESSIVE DISORDER (HCC): Primary | ICD-10-CM

## 2021-05-25 DIAGNOSIS — F41.1 GAD (GENERALIZED ANXIETY DISORDER): ICD-10-CM

## 2021-05-25 PROCEDURE — 99215 OFFICE O/P EST HI 40 MIN: CPT | Performed by: PSYCHIATRY & NEUROLOGY

## 2021-05-25 RX ORDER — DULOXETIN HYDROCHLORIDE 20 MG/1
20 CAPSULE, DELAYED RELEASE ORAL 2 TIMES DAILY
Qty: 60 CAPSULE | Refills: 3 | Status: SHIPPED | OUTPATIENT
Start: 2021-05-25

## 2021-05-25 RX ORDER — PROPRANOLOL HYDROCHLORIDE 10 MG/1
10 TABLET ORAL 3 TIMES DAILY PRN
Qty: 90 TABLET | Refills: 3 | Status: SHIPPED | OUTPATIENT
Start: 2021-05-25

## 2021-05-25 RX ORDER — QUETIAPINE FUMARATE 50 MG/1
50 TABLET, FILM COATED ORAL EVERY EVENING
Qty: 30 TABLET | Refills: 3 | Status: SHIPPED | OUTPATIENT
Start: 2021-05-25

## 2021-05-25 NOTE — PROGRESS NOTES
5/25/2021    IN PERSON Appointment PSYCHIATRY FOLLOWUP       Psychiatric Diagnoses:  1. Moderate episode of recurrent major depressive disorder ([facility] [address]    2. FELIZ (generalized anxiety disorder)            40 mins total for this encounter = 40 minutes with direct communication with patient face to face for appointment at [facility] office location     Patient and Provider location: [facility] [address] [address] [address]         Psychiatric Diagnoses:  1. Moderate episode of recurrent major depressive disorder ([facility] [address]    2. FELIZ (generalized anxiety disorder)        Assessment/Plan:   Patient denies thoughts of harm to self or others. No acute safety concerns voiced at this appointment. 17 weeks pregnant, just got back non-invasive testing and is told she is having a girl, which she is happy about. Patient reports she has had relative worsening of mood after 3 days off duloxetine (Cymbalta). Denies suicidal ideation, thoughts of harm to self or others. Patient participated in discussion of risks and benefits of restarting, continuing duloxetine (Cymbalta) and after a thorough discussion of the risks and benefits, patient requested to continue duloxetine (Cymbalta). No acute concerns today. ASSESSMENT/PLAN: NO CHANGES TO MEDICATIONS: Patient doing well on current medication regimen. No changes. Mood improved, less depressed and more motivated. Less anxiety throughout the day, able to attend to ADLs. Continue to encourage physical activity and adherence to medication regimen. Denies auditory or visual hallucinations. No paranoid thoughts, no delusional thought content expressed in this appointment. No thoughts of harm to self or others voiced. No acute concerns voiced. MOOD: WORSENED SYMPTOMS OF DEPRESSION: Patient reports worsening of symptoms of low mood, low energy and low motivation, as well as anhedonia, struggling to be motivated to complete necessary work and attend to ADLs.      SLEEP: UNCHANGED: Continues to struggle with sleep, which is unchanged. Sleeping 4 hours a night. ATTENTION AND FOCUS: NO IMPROVEMENT: Patient reports continued concerns with attention and focus. Medication has not been helpful/is not tolerated and we will change medication as below. ANXIETY: CONTINUED SIGNIFICANT CONCERNS: Patient reports continued symptoms of anxiety, which are largely unchanged. Anxiety continues to cause difficulty in interpersonal relationships and with performing her duties at work. BIPOLAR DEBORAH: patient has no history of deborah symptoms    SUBSTANCE USE: patient denies any history of substance use, and no concerns today    COUNSELING or THERAPY:   Continue to encourage therapy as part of ongoing treatment of their mental health concerns. Continue to encourage physical activity and adherence to medication regimen. DATE and changes made  Celexa (for 5 months) and Vistaril)  · 8/31  ? Decreased Celexa from 40 mg QD to 20 mg QD   ? Stopped omeprazole   ? STARTED Wellbutrin  mg QD   · 9/14/20  ? Worsening depressed mood   ? Celexa 20 mg QD will decrease to 5 mg for 7 days then off  ? START low dose of Cymbalta today 20 mg QD   ? Explained risk of serotonin syndrome, patient is aware of risk agrees to go to ER with any symptoms of confusion, muscle rigidity or tremor or fevers  · 9/21/20  ? Depression improvement   ? Celexa taper is going to be complete today or tomorrow pt states   ? Continue wellbutrin   ? START Propranolol 10 mg TID PRN anxiety   ? GENESite   · 10/12  ? Review gene sight - ultrarapid 2d6   ? CYMBALTA WILL INCREASE TO 20 mg BID (may need TID)   § Per gene sight, patient is ultrarapid 2d6 metabolizer so will likely require higher dose of cymbalta if this is to be effective   ? Second option would be to try vilazodone, which had no interactions   ? CONT Wellbutrin  mg (no gene interactions)   ? Cont celexa 5 mg QD - has had difficulty stopping altogether   ?  INCREASE propranolol assessment and plan. Patient reports they have been compliant with current medication regimen and have not missed a dose. At today's visit, patient denies thoughts of harm to self or others since last appointment, and denies auditory or visual hallucinations. At today's visit, pt reports that since our last visit, their average MOOD has been (on a scale of 1-10, with 1 being severely depressed and 10 being not depressed at all          Very depressed [] 1  [] 2  [x] 3  [] 4  [] 5  [] 6  [] 7  [] 8  [] 9  [] 10  Not depressed    At today's visit, pt reports that since our last visit, their average ANXIETY has been (on a scale of 1-10, with 10 being severely anxious and 1 being not anxious at all)            Not anxious [] 1     [] 2     [] 3     [] 4   [x] 5    [] 6      [] 7   [] 8   [] 9       [] 10  Very anxious     At today's visit, pt reports that since our last visit, their average APPETITE has been  [] Decreased     [x] Normal/Unchanged   [] Increased      At today's visit, pt reports that since our last visit, their average Energy has been   [] Poor    [x] Average  [] Increased         Aggression:  [] yes  [x] no    Patient is [x] Able to contract for safety  [] unable to CONTRACT FOR SAFETY     ROS:  [x] All negative/unchanged except if checked.  Explain positive(checked items) below:     Denies any new or changed physical symptoms other than those noted in the subjective portion of this note   [] Constitutional  [] Eyes  [] Ear/Nose/Mouth/Throat  [] Respiratory  [] CV  [] GI  []   [] Musculoskeletal  [] Skin/Breast  [] Neurological  [] Endocrine  [] Heme/Lymph  [] Allergic/Immunologic    MEDICATIONS:    Current Outpatient Medications:     DULoxetine (CYMBALTA) 20 MG extended release capsule, Take 1 capsule by mouth 2 times daily, Disp: 60 capsule, Rfl: 3    QUEtiapine (SEROQUEL) 50 MG tablet, Take 1 tablet by mouth every evening, Disp: 30 tablet, Rfl: 3    propranolol (INDERAL) 10 MG tablet, Take 1 tablet by mouth 3 times daily as needed (anxiety), Disp: 90 tablet, Rfl: 3    doxyLAMINE succinate (GNP SLEEP AID) 25 MG tablet, Take 1 tablet by mouth nightly, Disp: 30 tablet, Rfl: 3    acetaminophen (AMINOFEN) 325 MG tablet, Take 2 tablets by mouth every 6 hours as needed for Pain, Disp: 40 tablet, Rfl: 0    ondansetron (ZOFRAN-ODT) 4 MG disintegrating tablet, Take 1 tablet by mouth 3 times daily as needed for Nausea or Vomiting (Patient not taking: Reported on 4/6/2021), Disp: 21 tablet, Rfl: 0    enoxaparin (LOVENOX) 80 MG/0.8ML injection, Inject 0.8 mLs into the skin 2 times daily, Disp: 48 mL, Rfl: 5    buPROPion (WELLBUTRIN XL) 150 MG extended release tablet, Take 1 tablet by mouth every morning, Disp: 30 tablet, Rfl: 3    famotidine (PEPCID) 20 MG tablet, Take 1 tablet by mouth 2 times daily, Disp: 60 tablet, Rfl: 3    hydrOXYzine (VISTARIL) 25 MG capsule, take one capsule by mouth 4 times daily as needed for anxiety, Disp: , Rfl:     Examination:    Vitals: not taken in person, most recent vitals in chart reviewed  There were no vitals filed for this visit.     Wt Readings from Last 3 Encounters:   05/04/21 273 lb (123.8 kg)   04/08/21 271 lb (122.9 kg)   04/07/21 272 lb (123.4 kg)       BP Readings from Last 3 Encounters:   05/04/21 116/84   04/08/21 100/80   04/07/21 128/87           Labs:   no recent labs    Mental Status Examination:    Level of consciousness:  alert and oriented to person, place, and situation  Appearance:  well-appearing good grooming and good hygiene  Behavior/Motor:  no abnormalities noted  Attitude toward examiner: friendly, pleasant and cooperative, attentive and good eye contact  Speech:  spontaneous, normal rate and normal volume   Mood: \"down\"  Affect:  mood congruent  Thought processes:  linear and logical  Thought content:  Denies suicidal or homicidal ideation, denies auditory or visual hallucinations  Cognition:  no deficits in attention, concentration notable, recent memory grossly intact  Concentration intact  Memory intact  Insight good   Judgement fair   Fund of Knowledge adequate    Treatment Plan:  Reviewed current Medications with the patient. Education provided on the compliance with treatment. Reviewed OARRs, no concerns identified         PSYCHOTHERAPY/COUNSELING:  Encourage patient to attend outpatient appointments and therapy. [x] Therapeutic interview  [] Supportive  [x] CBT  [x] Ongoing  [] Other    No follow-ups on file. patient informed to call for follow-up or to reschedule appointment         Roxy Mc MD  Electronically signed by Roxy Mc MD on 5/25/2021 at 3:57 PM  5/25/2021 3:57 PM    Psychiatry     An  electronic signature was used to authenticate this note.   --Roxy Mc MD on 5/25/2021 at 3:57 PM

## 2021-05-26 ENCOUNTER — PATIENT MESSAGE (OUTPATIENT)
Dept: BEHAVIORAL/MENTAL HEALTH CLINIC | Age: 31
End: 2021-05-26

## 2021-06-01 ENCOUNTER — ROUTINE PRENATAL (OUTPATIENT)
Dept: OBGYN CLINIC | Age: 31
End: 2021-06-01

## 2021-06-01 VITALS
SYSTOLIC BLOOD PRESSURE: 120 MMHG | HEART RATE: 99 BPM | BODY MASS INDEX: 46.86 KG/M2 | WEIGHT: 273 LBS | DIASTOLIC BLOOD PRESSURE: 60 MMHG

## 2021-06-01 DIAGNOSIS — Z34.82 ENCOUNTER FOR SUPERVISION OF OTHER NORMAL PREGNANCY IN SECOND TRIMESTER: Primary | ICD-10-CM

## 2021-06-01 DIAGNOSIS — Z3A.17 17 WEEKS GESTATION OF PREGNANCY: ICD-10-CM

## 2021-06-01 DIAGNOSIS — Z34.82 ENCOUNTER FOR SUPERVISION OF OTHER NORMAL PREGNANCY IN SECOND TRIMESTER: ICD-10-CM

## 2021-06-01 DIAGNOSIS — D68.61 ANTIPHOSPHOLIPID SYNDROME IN PREGNANCY (HCC): ICD-10-CM

## 2021-06-01 DIAGNOSIS — E03.8 OTHER SPECIFIED HYPOTHYROIDISM: ICD-10-CM

## 2021-06-01 DIAGNOSIS — O99.119 ANTIPHOSPHOLIPID SYNDROME IN PREGNANCY (HCC): ICD-10-CM

## 2021-06-01 PROCEDURE — 0502F SUBSEQUENT PRENATAL CARE: CPT | Performed by: OBSTETRICS & GYNECOLOGY

## 2021-06-01 RX ORDER — PROMETHAZINE HYDROCHLORIDE 12.5 MG/1
SUPPOSITORY RECTAL
Status: ON HOLD | COMMUNITY
Start: 2021-05-18 | End: 2021-10-27 | Stop reason: HOSPADM

## 2021-06-03 LAB
AFP INTERPRETATION: NORMAL
AFP MOM: 1.28
AFP SPECIMEN: NORMAL
D-INHIBIN: 208 PG/ML
DATING: NORMAL
EER MATERNAL SCREEN AFP, HCG, EST, INH: NORMAL
ESTIMATED DUE DATE: NORMAL
FETUS COUNT: NORMAL
GESTATIONAL AGE CALC AT COLLECT: NORMAL
HISTORY OF ANEUPLOIDY?: NO
HISTORY/NEURAL TUBE DEFECTS: NO
INSULIN DEP. DIABETIC: NO
MATERNAL AGE AT EDD: 32.5 YR
MATERNAL WEIGHT: NORMAL
MOM FOR HCG, 2ND TRIMESTER: 0.81
MOM FOR UE3: 0.86
MOM INHIBN: 1.9
PATIENT'S HCG, 2ND TRIMESTER: NORMAL IU/L
PT AFP: 32 NG/ML
PT UE3: 0.88 NG/ML
RACE: NORMAL
SMOKING: NO

## 2021-07-02 ENCOUNTER — ROUTINE PRENATAL (OUTPATIENT)
Dept: OBGYN CLINIC | Age: 31
End: 2021-07-02

## 2021-07-02 VITALS
HEART RATE: 102 BPM | WEIGHT: 271 LBS | BODY MASS INDEX: 46.52 KG/M2 | SYSTOLIC BLOOD PRESSURE: 120 MMHG | DIASTOLIC BLOOD PRESSURE: 60 MMHG

## 2021-07-02 DIAGNOSIS — Z3A.21 21 WEEKS GESTATION OF PREGNANCY: ICD-10-CM

## 2021-07-02 DIAGNOSIS — Z34.82 ENCOUNTER FOR SUPERVISION OF OTHER NORMAL PREGNANCY IN SECOND TRIMESTER: Primary | ICD-10-CM

## 2021-07-02 PROCEDURE — 0502F SUBSEQUENT PRENATAL CARE: CPT | Performed by: OBSTETRICS & GYNECOLOGY

## 2021-07-02 NOTE — PROGRESS NOTES
Patient's last menstrual period was 01/27/2021 (exact date). Please reference prenatal and OB flow chart for further information  PT here today for routine prenatal care  Pt endorses fetal movement and denies loss of fluid, contractions or vaginal bleeding  Pt without complaints  ROS:  Pt denies headache, dysuria, nausea/vomiting  PE:  /60   Pulse 102   Wt 271 lb (122.9 kg)   LMP 01/27/2021 (Exact Date)   BMI 46.52 kg/m²   Gen - Alert and oriented x 3  HEENT- NC/AT, CVS - RRR, Lungs - CTAB  Abd - FH Appropriate fetal growth  LE no edema  Reassuring fetal status at this time     Diagnosis Orders   1. Encounter for supervision of other normal pregnancy in second trimester     2. 21 weeks gestation of pregnancy         Upon completion of the visit all questions were answered and the patients follow-up and testing schedule were reviewed.

## 2021-07-20 ENCOUNTER — HOSPITAL ENCOUNTER (OUTPATIENT)
Age: 31
Discharge: HOME | End: 2021-07-20
Attending: OBSTETRICS & GYNECOLOGY | Admitting: OBSTETRICS & GYNECOLOGY
Payer: COMMERCIAL

## 2021-07-20 VITALS
DIASTOLIC BLOOD PRESSURE: 86 MMHG | SYSTOLIC BLOOD PRESSURE: 135 MMHG | HEART RATE: 87 BPM | TEMPERATURE: 98.8 F | RESPIRATION RATE: 16 BRPM

## 2021-07-20 LAB
AMPHETAMINE SCREEN, URINE: NORMAL
BARBITURATE SCREEN URINE: NORMAL
BENZODIAZEPINE SCREEN, URINE: NORMAL
BILIRUBIN URINE: NEGATIVE
BLOOD, URINE: NEGATIVE
CANNABINOID SCREEN URINE: NORMAL
CLARITY: CLEAR
COCAINE METABOLITE SCREEN URINE: NORMAL
COLOR: ABNORMAL
GLUCOSE URINE: NEGATIVE MG/DL
KETONES, URINE: >=80 MG/DL
LEUKOCYTE ESTERASE, URINE: NEGATIVE
Lab: NORMAL
METHADONE SCREEN, URINE: NORMAL
NITRITE, URINE: NEGATIVE
OPIATE SCREEN URINE: NORMAL
OXYCODONE URINE: NORMAL
PH UA: 5.5 (ref 5–9)
PHENCYCLIDINE SCREEN URINE: NORMAL
PROPOXYPHENE SCREEN: NORMAL
PROTEIN UA: ABNORMAL MG/DL
SPECIFIC GRAVITY UA: 1.02 (ref 1–1.03)
UROBILINOGEN, URINE: 1 E.U./DL

## 2021-07-20 PROCEDURE — 81003 URINALYSIS AUTO W/O SCOPE: CPT

## 2021-07-20 PROCEDURE — 99283 EMERGENCY DEPT VISIT LOW MDM: CPT

## 2021-07-20 PROCEDURE — 59025 FETAL NON-STRESS TEST: CPT | Performed by: OBSTETRICS & GYNECOLOGY

## 2021-07-20 PROCEDURE — 80307 DRUG TEST PRSMV CHEM ANLYZR: CPT

## 2021-07-20 PROCEDURE — 99283 EMERGENCY DEPT VISIT LOW MDM: CPT | Performed by: OBSTETRICS & GYNECOLOGY

## 2021-07-20 PROCEDURE — 6370000000 HC RX 637 (ALT 250 FOR IP): Performed by: OBSTETRICS & GYNECOLOGY

## 2021-07-20 RX ORDER — ONDANSETRON 4 MG/1
4 TABLET, ORALLY DISINTEGRATING ORAL EVERY 8 HOURS PRN
Qty: 30 TABLET | Refills: 2 | Status: ON HOLD | OUTPATIENT
Start: 2021-07-20 | End: 2021-10-27 | Stop reason: HOSPADM

## 2021-07-20 RX ORDER — ONDANSETRON 4 MG/1
4 TABLET, ORALLY DISINTEGRATING ORAL ONCE
Status: COMPLETED | OUTPATIENT
Start: 2021-07-20 | End: 2021-07-20

## 2021-07-20 RX ADMIN — ONDANSETRON 4 MG: 4 TABLET, ORALLY DISINTEGRATING ORAL at 21:03

## 2021-07-20 NOTE — PROGRESS NOTES
Pt arrives to triage with complaints of nausea and vomiting, abdominal pain,  and \"lowering and softening of cervix\". Oriented to room and instructed on providing urine sample. To be evaluated in triage by RN and physician.

## 2021-07-21 NOTE — FLOWSHEET NOTE
Dr. Ana Quispe called and updated on patient after giving Zofran. Feels much better - no emesis. POC discharge home.

## 2021-07-21 NOTE — FLOWSHEET NOTE
Dr. Anna Blue called and updated on patient's arrival. To give Zofran PO once to see if it helps patient with nausea. Will continue observing.

## 2021-07-21 NOTE — ED NOTES
Department of Obstetrics and Gynecology   Emergency Department, OB triage    Pt Name: Asa Moore  MRN: [id number] Tee #: [de-identified]  YOB: 1990  Estimated Date of Delivery: 21      HPI: The patient is a 27 y.o. [id number] 18w0d female who presents to [facility] triage for nausea/vomiting today. PT states she tries a phenergan suppository, dramamine and pepcid prior to arrival but pt still feels nauseated. Pt performing regular self cervical checks and pt feels that her cx is softer than previously but pt without evidence of threatened labor. PT also undergoing evaluation with MFM for APL and h/o pre-eclampsia with delivery via CD with last pregnancy. +FM, -VB, -LOF, -CTX    Allergies: Allergies as of 2021 - Fully Reviewed 2021   Allergen Reaction Noted    Amoxicillin Nausea And Vomiting and Rash 2017    Erythromycin Rash 2017    Tequin [gatifloxacin] Nausea And Vomiting 2017       Medications:  No current facility-administered medications for this encounter. OB History: [id number]    Gyn History: Denies h/o abnormal pap smear, h/o STDs. Past Medical History:   Past Medical History:   Diagnosis Date    Adrenal insufficiency ([place]'s disease) ([facility] [address] [facility]     Anti-phospholipid antibody syndrome ([facility] [address]     Anxiety 2017    Depression 2017    Disease of blood and blood forming organ     History of blood transfusion        Past Surgical History:   Past Surgical History:   Procedure Laterality Date     SECTION      GALLBLADDER SURGERY Bilateral     OVARY REMOVAL Left        Social History:   Social History     Tobacco Use   Smoking Status Former Smoker    Types: Cigarettes    Quit date: 7/3/2015    Years since quittin.0   Smokeless Tobacco Never Used        Family History: Noncontributory; Denies h/o cancer. ROS:  Negative except as stated in HPI, denies nausea, vomiting, fever, chills, headache or dysuria.      PE:  Vitals:    21

## 2021-07-21 NOTE — FLOWSHEET NOTE
Discharge instructions given at this time. Prescriptions sent to pharmacy for oral Zofran. No questions at this time.

## 2021-07-30 ENCOUNTER — NURSE ONLY (OUTPATIENT)
Dept: OBGYN CLINIC | Age: 31
End: 2021-07-30

## 2021-07-30 DIAGNOSIS — Z34.82 ENCOUNTER FOR SUPERVISION OF OTHER NORMAL PREGNANCY IN SECOND TRIMESTER: ICD-10-CM

## 2021-07-30 DIAGNOSIS — Z34.82 ENCOUNTER FOR SUPERVISION OF OTHER NORMAL PREGNANCY IN SECOND TRIMESTER: Primary | ICD-10-CM

## 2021-07-30 LAB
GLUCOSE, 1HR PP: 181 MG/DL (ref 60–140)
HCT VFR BLD CALC: 35.4 % (ref 37–47)
HEMOGLOBIN: 11.9 G/DL (ref 12–16)

## 2021-07-31 DIAGNOSIS — Z34.82 ENCOUNTER FOR SUPERVISION OF OTHER NORMAL PREGNANCY IN SECOND TRIMESTER: ICD-10-CM

## 2021-07-31 LAB
GLUCOSE FASTING: 111 MG/DL (ref 0–95)
GLUCOSE TOLERANCE TEST 1 HOUR: 218 MG/DL (ref 0–180)
GLUCOSE TOLERANCE TEST 2 HOUR: 195 MG/DL (ref 0–155)
GLUCOSE TOLERANCE TEST 3 HOUR: 154 MG/DL (ref 0–140)

## 2021-08-02 RX ORDER — UBIQUINOL 100 MG
CAPSULE ORAL
Qty: 100 EACH | Refills: 0 | Status: SHIPPED | OUTPATIENT
Start: 2021-08-02 | End: 2021-10-06 | Stop reason: SDUPTHER

## 2021-08-02 RX ORDER — LANCETS 30 GAUGE
EACH MISCELLANEOUS
Qty: 120 EACH | Refills: 3 | Status: ON HOLD | OUTPATIENT
Start: 2021-08-02 | End: 2021-10-27 | Stop reason: HOSPADM

## 2021-08-02 RX ORDER — GLUCOSAMINE HCL/CHONDROITIN SU 500-400 MG
CAPSULE ORAL
Qty: 120 STRIP | Refills: 3 | Status: SHIPPED | OUTPATIENT
Start: 2021-08-02 | End: 2021-10-06 | Stop reason: SDUPTHER

## 2021-08-04 RX ORDER — GLYBURIDE 2.5 MG/1
2.5 TABLET ORAL 2 TIMES DAILY WITH MEALS
Qty: 60 TABLET | Refills: 3 | Status: SHIPPED | OUTPATIENT
Start: 2021-08-04 | End: 2021-10-06 | Stop reason: SDUPTHER

## 2021-08-11 ENCOUNTER — ROUTINE PRENATAL (OUTPATIENT)
Dept: OBGYN CLINIC | Age: 31
End: 2021-08-11

## 2021-08-11 VITALS
DIASTOLIC BLOOD PRESSURE: 76 MMHG | WEIGHT: 268 LBS | SYSTOLIC BLOOD PRESSURE: 128 MMHG | HEART RATE: 101 BPM | BODY MASS INDEX: 46 KG/M2

## 2021-08-11 DIAGNOSIS — Z3A.27 27 WEEKS GESTATION OF PREGNANCY: ICD-10-CM

## 2021-08-11 DIAGNOSIS — O09.93 SUPERVISION OF HIGH RISK PREGNANCY IN THIRD TRIMESTER: Primary | ICD-10-CM

## 2021-08-11 DIAGNOSIS — O24.415 GESTATIONAL DIABETES MELLITUS (GDM) CONTROLLED ON ORAL HYPOGLYCEMIC DRUG, ANTEPARTUM: ICD-10-CM

## 2021-08-11 DIAGNOSIS — D68.312 ANTIPHOSPHOLIPID ANTIBODY WITH HEMORRHAGIC DISORDER (HCC): ICD-10-CM

## 2021-08-11 PROCEDURE — 0502F SUBSEQUENT PRENATAL CARE: CPT | Performed by: OBSTETRICS & GYNECOLOGY

## 2021-08-11 RX ORDER — NEBULIZER AND COMPRESSOR
1 EACH MISCELLANEOUS 3 TIMES DAILY
Qty: 1 KIT | Refills: 0 | Status: SHIPPED | OUTPATIENT
Start: 2021-08-11 | End: 2021-10-06 | Stop reason: SDUPTHER

## 2021-08-11 NOTE — PROGRESS NOTES
Patient's last menstrual period was 01/27/2021 (exact date). Please reference prenatal and OB flow chart for further information  PT here today for routine prenatal care  Pt endorses fetal movement and denies loss of fluid, contractions or vaginal bleeding  Pt without complaints  ROS:  Pt denies headache, dysuria, nausea/vomiting  PE:  /76   Pulse 101   Wt 268 lb (121.6 kg)   LMP 01/27/2021 (Exact Date)   BMI 46.00 kg/m²   Gen - Alert and oriented x 3  HEENT- NC/AT, CVS - RRR, Lungs - CTAB  Abd - FH Appropriate fetal growth  LE no edema  Reassuring fetal status at this time     Diagnosis Orders   1. Supervision of high risk pregnancy in third trimester  Comprehensive Metabolic Panel    CBC Auto Differential    Protein, 24 Hr Urine    Uric Acid    Blood Pressure Monitoring (ADULT BLOOD PRESSURE CUFF LG) KIT   2. 27 weeks gestation of pregnancy  Comprehensive Metabolic Panel    CBC Auto Differential    Protein, 24 Hr Urine    Uric Acid    Blood Pressure Monitoring (ADULT BLOOD PRESSURE CUFF LG) KIT   3. Antiphospholipid antibody with hemorrhagic disorder (HCC)  Comprehensive Metabolic Panel    CBC Auto Differential    Protein, 24 Hr Urine    Uric Acid    Blood Pressure Monitoring (ADULT BLOOD PRESSURE CUFF LG) KIT   4. Gestational diabetes mellitus (GDM) controlled on oral hypoglycemic drug, antepartum  Comprehensive Metabolic Panel    CBC Auto Differential    Protein, 24 Hr Urine    Uric Acid    Blood Pressure Monitoring (ADULT BLOOD PRESSURE CUFF LG) KIT       Upon completion of the visit all questions were answered and the patients follow-up and testing schedule were reviewed. Patient is currently without complaints some fatigue and just not feeling well overall no edema no headache no blurred vision some dizziness. Patient has not started her glyburide but review of blood sugars today reveal fasting blood sugars between 125 and 130s daily with abnormal postprandials as well.   Patient will start meds today. Patient had follow-up ultrasound with MFM a couple weeks ago serial ultrasounds were recommended however we will definitely will have the patient be seen for this acute proteinuria. 24-hour urine was ordered as well as LFTs and a CBC patient was delivered previously at 36 weeks secondary to severe preeclampsia.   Patient stated during that pregnancy she also did not have any elevations of her blood pressure just a spillage of protein

## 2021-09-01 ENCOUNTER — ROUTINE PRENATAL (OUTPATIENT)
Dept: OBGYN CLINIC | Age: 31
End: 2021-09-01

## 2021-09-01 ENCOUNTER — HOSPITAL ENCOUNTER (OUTPATIENT)
Age: 31
Discharge: HOME | End: 2021-09-01
Attending: OBSTETRICS & GYNECOLOGY | Admitting: OBSTETRICS & GYNECOLOGY
Payer: COMMERCIAL

## 2021-09-01 ENCOUNTER — HOSPITAL ENCOUNTER (OUTPATIENT)
Dept: ULTRASOUND IMAGING | Age: 31
Discharge: HOME | End: 2021-09-03
Payer: COMMERCIAL

## 2021-09-01 VITALS
WEIGHT: 265 LBS | DIASTOLIC BLOOD PRESSURE: 86 MMHG | BODY MASS INDEX: 45.49 KG/M2 | SYSTOLIC BLOOD PRESSURE: 116 MMHG | HEART RATE: 108 BPM

## 2021-09-01 VITALS
HEART RATE: 116 BPM | TEMPERATURE: 98.4 F | RESPIRATION RATE: 18 BRPM | DIASTOLIC BLOOD PRESSURE: 70 MMHG | SYSTOLIC BLOOD PRESSURE: 116 MMHG

## 2021-09-01 DIAGNOSIS — O24.415 GESTATIONAL DIABETES MELLITUS (GDM) CONTROLLED ON ORAL HYPOGLYCEMIC DRUG, ANTEPARTUM: ICD-10-CM

## 2021-09-01 DIAGNOSIS — O24.415 GESTATIONAL DIABETES MELLITUS (GDM) CONTROLLED ON ORAL HYPOGLYCEMIC DRUG, ANTEPARTUM: Primary | ICD-10-CM

## 2021-09-01 DIAGNOSIS — O09.93 SUPERVISION OF HIGH RISK PREGNANCY IN THIRD TRIMESTER: ICD-10-CM

## 2021-09-01 PROCEDURE — 59025 FETAL NON-STRESS TEST: CPT | Performed by: OBSTETRICS & GYNECOLOGY

## 2021-09-01 PROCEDURE — 76818 FETAL BIOPHYS PROFILE W/NST: CPT

## 2021-09-01 PROCEDURE — 59025 FETAL NON-STRESS TEST: CPT

## 2021-09-01 PROCEDURE — 0502F SUBSEQUENT PRENATAL CARE: CPT | Performed by: OBSTETRICS & GYNECOLOGY

## 2021-09-01 NOTE — LETTER
[facility] Obstetrics and Gynecology  [address] [address]  Phone: [phone]  Fax: [address],         September 1, 2021     Patient: Alexandre Contreras   YOB: 1990   Date of Visit: 9/1/2021       To Whom it May Concern:    Alexandre Contreras was seen in my clinic on 9/1/2021. Alexandre Contreras may return to work on 9/2/2021. If you have any questions or concerns, please don't hesitate to call.     Sincerely,         Kimi Berumen DO

## 2021-09-01 NOTE — PROGRESS NOTES
C/o headaches visual disturbances and feeling lightheaded. Denies swelling. Checking bs. Highest 260 and lowest 76.  Takes glyburide 5

## 2021-09-01 NOTE — PROGRESS NOTES
Pt here for NST d/t gestational diabetes, was on oral meds, but is starting insulin tomorrow.  Pt had BPP prior to arrival. Placed on monitor

## 2021-09-01 NOTE — PROGRESS NOTES
30w1d         Department of Obstetrics and Gynecology  Labor and Delivery  Attending   NST Review  09/01/21   Yehuda Deleon      Pt here for NST 2/2 to GDM     Vitals:    09/01/21 1437   Resp: 18   Temp: 98.4 °F (36.9 °C)     116/86mm Hg    FHR 130s, mod santos, +accels, no decels. Reactive NST    [place] irregular ctx (pt not feeling)     BPP 10/10. EXAMINATION: BIOPHYSICAL PROFILE       CLINICAL HISTORY: 27year-old pregnant patient in the third trimester with uncontrolled gestational diabetes. She is being managed by maternal fetal medicine.       COMPARISONS: None available.       FINDINGS: Transabdominal ultrasound of the gravid uterus was performed for 30 minutes or less for evaluation of biophysical profile with scoring as follows:       2 out of 2 for fetal breathing       2 out of 2 for fetal movement.       2 out of 2 for fetal tone.       2 out of 2 for amniotic fluid volume.       Fetal heart rate of 152 beats per minute is documented. Amniotic fluid index calculated at 12.7 cm.                       IMPRESSION BIOPHYSICAL PROFILE WITH A SCORE OF 8 OUT OF 8. CLINICAL CORRELATION RECOMMENDED. ASSESSMENT & PLAN:      27 y.o. [id number] 30w1d with Estimated Date of Delivery: 11/9/21 Reactive NST   - Return precautions provided. No Obstetric complaints. +FM.  Okay to f/u as scheduled          Electronically signed by Boom Diamond MD on 9/1/2021 at 3:26 PM

## 2021-09-01 NOTE — PROGRESS NOTES
Patient's last menstrual period was 01/27/2021 (exact date). Please reference prenatal and OB flow chart for further information  PT here today for routine prenatal care  Pt endorses fetal movement and denies loss of fluid, contractions or vaginal bleeding  Pt without complaints  ROS:  Pt denies headache, dysuria, nausea/vomiting  PE:  /86   Pulse 108   Wt 265 lb (120.2 kg)   LMP 01/27/2021 (Exact Date)   BMI 45.49 kg/m²   Gen - Alert and oriented x 3  HEENT- NC/AT, CVS - RRR, Lungs - CTAB  Abd - FH Appropriate fetal growth  LE no edema  Reassuring fetal status at this time     Diagnosis Orders   1. Gestational diabetes mellitus (GDM) controlled on oral hypoglycemic drug, antepartum  US FETAL BIOPHYS PROFILE W NON STRESS   2. Supervision of high risk pregnancy in third trimester  Gladys Carlos       Upon completion of the visit all questions were answered and the patients follow-up and testing schedule were reviewed.   16nph/16 pm  43nph/21 am  Fasting blood sugar 110-150s   Patient not feeling well due to elevation BP normal

## 2021-09-04 ENCOUNTER — HOSPITAL ENCOUNTER (OUTPATIENT)
Age: 31
Discharge: HOME | End: 2021-09-04
Attending: OBSTETRICS & GYNECOLOGY | Admitting: OBSTETRICS & GYNECOLOGY
Payer: COMMERCIAL

## 2021-09-04 VITALS
HEART RATE: 110 BPM | RESPIRATION RATE: 18 BRPM | SYSTOLIC BLOOD PRESSURE: 136 MMHG | DIASTOLIC BLOOD PRESSURE: 79 MMHG | TEMPERATURE: 98.8 F

## 2021-09-04 PROCEDURE — 59025 FETAL NON-STRESS TEST: CPT | Performed by: OBSTETRICS & GYNECOLOGY

## 2021-09-04 PROCEDURE — 59025 FETAL NON-STRESS TEST: CPT

## 2021-09-04 NOTE — PROGRESS NOTES
Arrives for NST for gest diabetic. States is suppose to be on insulin but insurance is fighting it. Monitors explained then applied, verbalized understanding.

## 2021-09-04 NOTE — PROGRESS NOTES
27 y.o. [id number] 30w4d female who presents to [facility] triage for     NST secondary to  to GDM     She is not in insulin yet     BPP last week ( 9/1/2021)  was 8/8    NST today reactive: FHR 150s, mod santos, +accels, no decels.  Reactive NST        Dara Walsh MD

## 2021-09-04 NOTE — FLOWSHEET NOTE
Dr Amanda Dyson called. Patient here for NST which she receives 2 times per week and a BPP once per week. BPP done on Tuesday. Patient is gestational diabetic, reports that she is supposed to be on insulin but insurance is fighting her. Dr Amanda Dyson will be out.

## 2021-09-07 ENCOUNTER — HOSPITAL ENCOUNTER (OUTPATIENT)
Dept: ULTRASOUND IMAGING | Age: 31
Discharge: HOME | End: 2021-09-09
Payer: COMMERCIAL

## 2021-09-07 ENCOUNTER — HOSPITAL ENCOUNTER (OUTPATIENT)
Age: 31
Discharge: HOME | End: 2021-09-07
Attending: OBSTETRICS & GYNECOLOGY | Admitting: OBSTETRICS & GYNECOLOGY
Payer: COMMERCIAL

## 2021-09-07 VITALS
TEMPERATURE: 99.1 F | RESPIRATION RATE: 20 BRPM | HEART RATE: 109 BPM | OXYGEN SATURATION: 98 % | DIASTOLIC BLOOD PRESSURE: 81 MMHG | SYSTOLIC BLOOD PRESSURE: 135 MMHG

## 2021-09-07 DIAGNOSIS — O09.93 SUPERVISION OF HIGH RISK PREGNANCY IN THIRD TRIMESTER: ICD-10-CM

## 2021-09-07 DIAGNOSIS — O24.415 GESTATIONAL DIABETES MELLITUS (GDM) CONTROLLED ON ORAL HYPOGLYCEMIC DRUG, ANTEPARTUM: ICD-10-CM

## 2021-09-07 PROCEDURE — 59025 FETAL NON-STRESS TEST: CPT

## 2021-09-07 PROCEDURE — 76818 FETAL BIOPHYS PROFILE W/NST: CPT

## 2021-09-07 PROCEDURE — 59025 FETAL NON-STRESS TEST: CPT | Performed by: OBSTETRICS & GYNECOLOGY

## 2021-09-07 RX ORDER — INSULIN HUMAN 500 [IU]/ML
INJECTION, SOLUTION SUBCUTANEOUS
Qty: 6 EACH | Refills: 1 | Status: ON HOLD | OUTPATIENT
Start: 2021-09-07 | End: 2021-10-27 | Stop reason: HOSPADM

## 2021-09-07 NOTE — PROGRESS NOTES
NST for GDM  FHR: 150's, moderate variability, +accels, -decels  Ctx: irregular  Cat 1    Leda Allen MD

## 2021-09-07 NOTE — TELEPHONE ENCOUNTER
Needs rx for Humulin R U 100 or Novolin flex pen, please call the pharmacist [phone]. You sent in a highly concentrated dose.

## 2021-09-07 NOTE — PROGRESS NOTES
Pt here for scheduled NST after BPP (8/8) for gestational diabetes with glyburide. Pt feels good movement , did not eat breakfast today yet.

## 2021-09-08 ENCOUNTER — HOSPITAL ENCOUNTER (OUTPATIENT)
Age: 31
Discharge: HOME | End: 2021-09-08
Attending: OBSTETRICS & GYNECOLOGY | Admitting: OBSTETRICS & GYNECOLOGY
Payer: COMMERCIAL

## 2021-09-08 VITALS
SYSTOLIC BLOOD PRESSURE: 140 MMHG | HEART RATE: 83 BPM | HEIGHT: 64 IN | DIASTOLIC BLOOD PRESSURE: 70 MMHG | RESPIRATION RATE: 16 BRPM | TEMPERATURE: 98.4 F | WEIGHT: 265 LBS | BODY MASS INDEX: 45.24 KG/M2

## 2021-09-08 LAB
AMPHETAMINE SCREEN, URINE: NORMAL
BACTERIA: NEGATIVE /HPF
BARBITURATE SCREEN URINE: NORMAL
BENZODIAZEPINE SCREEN, URINE: NORMAL
BILIRUBIN URINE: NEGATIVE
BLOOD, URINE: NEGATIVE
CANNABINOID SCREEN URINE: NORMAL
CLARITY: CLEAR
COCAINE METABOLITE SCREEN URINE: NORMAL
COLOR: ABNORMAL
EPITHELIAL CELLS, UA: ABNORMAL /HPF (ref 0–5)
GLUCOSE URINE: NEGATIVE MG/DL
HYALINE CASTS: ABNORMAL /HPF (ref 0–5)
KETONES, URINE: 15 MG/DL
LEUKOCYTE ESTERASE, URINE: ABNORMAL
Lab: NORMAL
METHADONE SCREEN, URINE: NORMAL
NITRITE, URINE: NEGATIVE
OPIATE SCREEN URINE: NORMAL
OXYCODONE URINE: NORMAL
PH UA: 5.5 (ref 5–9)
PHENCYCLIDINE SCREEN URINE: NORMAL
PROPOXYPHENE SCREEN: NORMAL
PROTEIN UA: 30 MG/DL
RBC UA: ABNORMAL /HPF (ref 0–5)
RENAL EPITHELIAL, UA: ABNORMAL /HPF
SARS-COV-2, NAAT: NOT DETECTED
SPECIFIC GRAVITY UA: 1.03 (ref 1–1.03)
UROBILINOGEN, URINE: 1 E.U./DL
WBC UA: ABNORMAL /HPF (ref 0–5)

## 2021-09-08 PROCEDURE — 59025 FETAL NON-STRESS TEST: CPT

## 2021-09-08 PROCEDURE — 99213 OFFICE O/P EST LOW 20 MIN: CPT | Performed by: OBSTETRICS & GYNECOLOGY

## 2021-09-08 PROCEDURE — 87635 SARS-COV-2 COVID-19 AMP PRB: CPT

## 2021-09-08 PROCEDURE — 6360000002 HC RX W HCPCS: Performed by: OBSTETRICS & GYNECOLOGY

## 2021-09-08 PROCEDURE — 80307 DRUG TEST PRSMV CHEM ANLYZR: CPT

## 2021-09-08 PROCEDURE — 81001 URINALYSIS AUTO W/SCOPE: CPT

## 2021-09-08 PROCEDURE — 2580000003 HC RX 258: Performed by: OBSTETRICS & GYNECOLOGY

## 2021-09-08 RX ORDER — SODIUM CHLORIDE, SODIUM LACTATE, POTASSIUM CHLORIDE, AND CALCIUM CHLORIDE .6; .31; .03; .02 G/100ML; G/100ML; G/100ML; G/100ML
500 INJECTION, SOLUTION INTRAVENOUS ONCE
Status: COMPLETED | OUTPATIENT
Start: 2021-09-08 | End: 2021-09-08

## 2021-09-08 RX ORDER — ONDANSETRON 2 MG/ML
4 INJECTION INTRAMUSCULAR; INTRAVENOUS EVERY 6 HOURS PRN
Status: DISCONTINUED | OUTPATIENT
Start: 2021-09-08 | End: 2021-09-08 | Stop reason: HOSPADM

## 2021-09-08 RX ADMIN — SODIUM CHLORIDE, POTASSIUM CHLORIDE, SODIUM LACTATE AND CALCIUM CHLORIDE 500 ML: 600; 310; 30; 20 INJECTION, SOLUTION INTRAVENOUS at 17:41

## 2021-09-08 RX ADMIN — ONDANSETRON 4 MG: 2 INJECTION INTRAMUSCULAR; INTRAVENOUS at 17:42

## 2021-09-08 NOTE — FLOWSHEET NOTE
aware of blood pressure 140/70 and patient states she \"feels better\" and that her her N&V and headache were gone. Orders received to discharge patient.

## 2021-09-08 NOTE — FLOWSHEET NOTE
Patient here for N&V, and diarrhea and states that she threw up this morning, Denies vaginal bleeding, and LOF, states +Fetal movement.

## 2021-09-08 NOTE — FLOWSHEET NOTE
Patient states that her fasting blood sugar this morning was 147 and then patient had emesis and then blood sugar was 125. Patient states usual blood sugar 110-150 fasting, and that she has insulin ordered however has not filled prescription due to insurance reasons and is waiting to get it.

## 2021-09-08 NOTE — FLOWSHEET NOTE
Verner Shears here at bedside viewed strip, orders to discontinue EFM and NST reactive. Plan of care discussed with patient, patient verbalize understanding.

## 2021-09-08 NOTE — ED TRIAGE NOTES
Department of Obstetrics and Gynecology  Labor and Delivery  Attending Triage Note      SUBJECTIVE:  Pt c/o n/v/d x 1 day. Denies any ob complaints. Reports some dizziness and SOB since the GI symptoms started. OBJECTIVE    Vitals:  BP (!) 141/86   Pulse 102   Temp 98.4 °F (36.9 °C)   Resp 16   Ht 5' 4\" (1.626 m)   Wt 265 lb (120.2 kg)   LMP 2021 (Exact Date)   BMI 45.49 kg/m²     CONSTITUTIONAL:  awake, alert, cooperative, no apparent distress, and appears stated age    Cervix:             Dilation:  n/a             Fetal Position:  n/a    Membranes:  Intact    Fetal heart rate:         Baseline Heart Rate:  145        Accelerations:  present       Decelerations:  absent       Variability:  moderate    Contraction frequency: irritabiligy minutes      DATA:  U/A:  No components found for: Joann Booth, USPGRAV, UPH, UPROTEIN, UGLUCOSE, UKETONE, UBILI, UBLOOD, UNITRITE, UUROBIL, ULEUKEST, USQEPI, URENEPI, UWBC, URBC, UBACTERIA, UHYALINE    ASSESSMENT & PLAN:    1. 28 yo  at 31+1 wks c/o n/v/d  2. Recommend COVID test  3. IVF LR Bolus, plus Zofran  4. If COVID negative and pt symptoms improve, pt may be d/c home - Pt states she feel better  5.  F/u with ob/gyn within 1 wk

## 2021-09-11 ENCOUNTER — HOSPITAL ENCOUNTER (OUTPATIENT)
Age: 31
Discharge: HOME | End: 2021-09-11
Attending: OBSTETRICS & GYNECOLOGY | Admitting: OBSTETRICS & GYNECOLOGY
Payer: COMMERCIAL

## 2021-09-11 VITALS
DIASTOLIC BLOOD PRESSURE: 75 MMHG | RESPIRATION RATE: 18 BRPM | HEART RATE: 100 BPM | TEMPERATURE: 99.2 F | SYSTOLIC BLOOD PRESSURE: 140 MMHG

## 2021-09-11 PROCEDURE — 59025 FETAL NON-STRESS TEST: CPT

## 2021-09-11 PROCEDURE — 59025 FETAL NON-STRESS TEST: CPT | Performed by: OBSTETRICS & GYNECOLOGY

## 2021-09-11 NOTE — ED TRIAGE NOTES
Department of Obstetrics and Gynecology  Labor and Delivery  Attending Triage Note      SUBJECTIVE:  Pt here for NST    OBJECTIVE    Vitals:   Vitals:    21 1313 21 1317 21 1335   BP: (!) 145/78 (!) 148/75 (!) 140/75   Pulse: 107 104 100   Resp: 18     Temp: 99.2 °F (37.3 °C)                Cervix:  n/a             Fetal Position:  n/a    Membranes:  n/a    Fetal heart rate:         Baseline Heart Rate:  150       Accelerations:  present       Decelerations:  absent       Variability:  moderate    Contraction frequency: 0 minutes      DATA:    ASSESSMENT & PLAN:    1. 26 yo  at 31+4 wks here for NST for hypertension, gestational diabetes  2. FHR strip reactive, category 1   3.  Pt may be d/c home w/return instructions

## 2021-09-14 ENCOUNTER — HOSPITAL ENCOUNTER (OUTPATIENT)
Age: 31
Discharge: HOME | End: 2021-09-14
Attending: OBSTETRICS & GYNECOLOGY | Admitting: OBSTETRICS & GYNECOLOGY
Payer: COMMERCIAL

## 2021-09-14 ENCOUNTER — HOSPITAL ENCOUNTER (OUTPATIENT)
Dept: ULTRASOUND IMAGING | Age: 31
Discharge: HOME | End: 2021-09-16
Payer: COMMERCIAL

## 2021-09-14 VITALS
SYSTOLIC BLOOD PRESSURE: 118 MMHG | DIASTOLIC BLOOD PRESSURE: 71 MMHG | TEMPERATURE: 99.1 F | OXYGEN SATURATION: 97 % | HEART RATE: 97 BPM | RESPIRATION RATE: 18 BRPM

## 2021-09-14 DIAGNOSIS — O24.415 GESTATIONAL DIABETES MELLITUS (GDM) CONTROLLED ON ORAL HYPOGLYCEMIC DRUG, ANTEPARTUM: ICD-10-CM

## 2021-09-14 DIAGNOSIS — O09.93 SUPERVISION OF HIGH RISK PREGNANCY IN THIRD TRIMESTER: ICD-10-CM

## 2021-09-14 PROCEDURE — 76819 FETAL BIOPHYS PROFIL W/O NST: CPT

## 2021-09-14 PROCEDURE — 59025 FETAL NON-STRESS TEST: CPT

## 2021-09-14 PROCEDURE — 59025 FETAL NON-STRESS TEST: CPT | Performed by: OBSTETRICS & GYNECOLOGY

## 2021-09-14 NOTE — ED TRIAGE NOTES
Department of Obstetrics and Gynecology  Labor and Delivery  Attending Triage Note      SUBJECTIVE:  Pt here for NST    OBJECTIVE    Vitals:  Vitals:    21 0831   BP: 118/71   Pulse: 97   Resp: 18   Temp: 99.1 °F (37.3 °C)   SpO2: 97%              Cervix:  n/a             Fetal Position:  n/a    Membranes:  n/a    Fetal heart rate:         Baseline Heart Rate:  150       Accelerations:  present       Decelerations:  absent       Variability:  moderate    Contraction frequency: 0 minutes      DATA:    ASSESSMENT & PLAN:    1. 28 yo  at 32 wks here for NST for hypertension, gestational diabetes  2. FHR strip reactive, category 1, BPP 8/8  3.  Pt may be d/c home w/return instructions

## 2021-09-17 ENCOUNTER — ROUTINE PRENATAL (OUTPATIENT)
Dept: OBGYN CLINIC | Age: 31
End: 2021-09-17

## 2021-09-17 VITALS
WEIGHT: 267 LBS | DIASTOLIC BLOOD PRESSURE: 74 MMHG | HEART RATE: 103 BPM | SYSTOLIC BLOOD PRESSURE: 124 MMHG | BODY MASS INDEX: 45.83 KG/M2

## 2021-09-17 DIAGNOSIS — O24.414 INSULIN CONTROLLED GESTATIONAL DIABETES MELLITUS (GDM) IN THIRD TRIMESTER: ICD-10-CM

## 2021-09-17 DIAGNOSIS — Z34.83 ENCOUNTER FOR SUPERVISION OF OTHER NORMAL PREGNANCY IN THIRD TRIMESTER: Primary | ICD-10-CM

## 2021-09-17 PROCEDURE — 0502F SUBSEQUENT PRENATAL CARE: CPT | Performed by: OBSTETRICS & GYNECOLOGY

## 2021-09-17 NOTE — PROGRESS NOTES
Patient's last menstrual period was 01/27/2021 (exact date). Please reference prenatal and OB flow chart for further information  PT here today for routine prenatal care  Pt endorses fetal movement and denies loss of fluid, contractions or vaginal bleeding  Pt without complaints  ROS:  Pt denies headache, dysuria, nausea/vomiting  PE:  /74   Pulse 103   Wt 267 lb (121.1 kg)   LMP 01/27/2021 (Exact Date)   BMI 45.83 kg/m²   Gen - Alert and oriented x 3  HEENT- NC/AT, CVS - RRR, Lungs - CTAB  Abd - FH Appropriate fetal growth  LE no edema  Reassuring fetal status at this time     Diagnosis Orders   1. Encounter for supervision of other normal pregnancy in third trimester     2. 32 week prematurity         Upon completion of the visit all questions were answered and the patients follow-up and testing schedule were reviewed.   Patient presents just got long acting insulin from pharmacy last pm first dose 117 fasting will send blood sugars on sunday

## 2021-09-18 ENCOUNTER — HOSPITAL ENCOUNTER (OUTPATIENT)
Age: 31
Discharge: HOME | End: 2021-09-18
Attending: OBSTETRICS & GYNECOLOGY | Admitting: OBSTETRICS & GYNECOLOGY
Payer: COMMERCIAL

## 2021-09-18 VITALS
DIASTOLIC BLOOD PRESSURE: 68 MMHG | TEMPERATURE: 98.6 F | HEART RATE: 104 BPM | RESPIRATION RATE: 18 BRPM | SYSTOLIC BLOOD PRESSURE: 137 MMHG

## 2021-09-18 PROCEDURE — 59025 FETAL NON-STRESS TEST: CPT

## 2021-09-18 NOTE — FLOWSHEET NOTE
Patient to triage room 313 for scheduled NST. Patient denies and vaginal bleeding, LOF and states positive fetal movement.

## 2021-09-18 NOTE — PROGRESS NOTES
Date:9/18/2021         Patient Name:Suad Trujillo     YOB: 1990     Age:31 y.o. 32yo [id number] @ 28 4/7wga (Estimated Date of Delivery: 11/9/21 ) presents for scheduled NST for gestational diabetes. Maternal Vitals  Temp: 98.6 °F (37 °C)  BP: 137/68  Pulse: 104  Resp: 18    Testing  Reason for NST: Gestational diabetes  Explained test to patient: Yes    Findings  Baseline: 140 BPM  Baseline Classification: Normal  Variability: Moderate  Decelerations: None  Accelerations: Yes  Acoustic Stimulator: No  Fetal Movement: Yes  Multiple Gestation?: No  Uterine contractions/10 min.: 0  Interpretation: Reactive  Interpreted by: dr Binu Ellison           Assessment:   1. Gestational Diabetes  2.  Pregnancy   3. 32 weeks gestation    Plan:  D/c home

## 2021-10-05 ENCOUNTER — TELEPHONE (OUTPATIENT)
Dept: OBGYN CLINIC | Age: 31
End: 2021-10-05

## 2021-10-05 ENCOUNTER — HOSPITAL ENCOUNTER (OUTPATIENT)
Age: 31
Discharge: HOME | End: 2021-10-05
Attending: OBSTETRICS & GYNECOLOGY | Admitting: OBSTETRICS & GYNECOLOGY
Payer: COMMERCIAL

## 2021-10-05 ENCOUNTER — HOSPITAL ENCOUNTER (OUTPATIENT)
Dept: ULTRASOUND IMAGING | Age: 31
Discharge: HOME | End: 2021-10-07
Payer: COMMERCIAL

## 2021-10-05 VITALS
HEIGHT: 64 IN | HEART RATE: 98 BPM | BODY MASS INDEX: 45.62 KG/M2 | DIASTOLIC BLOOD PRESSURE: 67 MMHG | SYSTOLIC BLOOD PRESSURE: 135 MMHG | WEIGHT: 267.2 LBS

## 2021-10-05 DIAGNOSIS — O09.93 SUPERVISION OF HIGH RISK PREGNANCY IN THIRD TRIMESTER: ICD-10-CM

## 2021-10-05 DIAGNOSIS — O24.415 GESTATIONAL DIABETES MELLITUS (GDM) CONTROLLED ON ORAL HYPOGLYCEMIC DRUG, ANTEPARTUM: ICD-10-CM

## 2021-10-05 DIAGNOSIS — Z3A.17 17 WEEKS GESTATION OF PREGNANCY: ICD-10-CM

## 2021-10-05 DIAGNOSIS — Z3A.27 27 WEEKS GESTATION OF PREGNANCY: ICD-10-CM

## 2021-10-05 DIAGNOSIS — Z34.82 ENCOUNTER FOR SUPERVISION OF OTHER NORMAL PREGNANCY IN SECOND TRIMESTER: ICD-10-CM

## 2021-10-05 DIAGNOSIS — D68.312 ANTIPHOSPHOLIPID ANTIBODY WITH HEMORRHAGIC DISORDER (HCC): ICD-10-CM

## 2021-10-05 PROCEDURE — 59025 FETAL NON-STRESS TEST: CPT | Performed by: OBSTETRICS & GYNECOLOGY

## 2021-10-05 PROCEDURE — 76817 TRANSVAGINAL US OBSTETRIC: CPT

## 2021-10-05 PROCEDURE — 59025 FETAL NON-STRESS TEST: CPT

## 2021-10-05 PROCEDURE — 76805 OB US >/= 14 WKS SNGL FETUS: CPT

## 2021-10-05 PROCEDURE — 76819 FETAL BIOPHYS PROFIL W/O NST: CPT

## 2021-10-05 NOTE — PROGRESS NOTES
Dr David Steinberg notified of BPP 8/8 and cervix 2.3cm, no new orders, Dr Kerline Santoyo at desk reviewing reactive NST

## 2021-10-05 NOTE — PROGRESS NOTES
Pt here for NST after BPP, pt is insulin dependant gestational diabetic, hx of 35 wk c/s d/t preclampsia. Pt reports good fetal movement.  BPP 8/8

## 2021-10-06 RX ORDER — GLYBURIDE 2.5 MG/1
2.5 TABLET ORAL 2 TIMES DAILY WITH MEALS
Qty: 60 TABLET | Refills: 3 | Status: ON HOLD | OUTPATIENT
Start: 2021-10-06 | End: 2021-10-27 | Stop reason: HOSPADM

## 2021-10-06 RX ORDER — NEBULIZER AND COMPRESSOR
1 EACH MISCELLANEOUS 3 TIMES DAILY
Qty: 1 KIT | Refills: 0 | Status: SHIPPED | OUTPATIENT
Start: 2021-10-06

## 2021-10-06 RX ORDER — GLUCOSAMINE HCL/CHONDROITIN SU 500-400 MG
CAPSULE ORAL
Qty: 120 STRIP | Refills: 3 | Status: ON HOLD | OUTPATIENT
Start: 2021-10-06 | End: 2021-10-27 | Stop reason: HOSPADM

## 2021-10-06 RX ORDER — UBIQUINOL 100 MG
CAPSULE ORAL
Qty: 100 EACH | Refills: 0 | Status: ON HOLD | OUTPATIENT
Start: 2021-10-06 | End: 2021-10-27 | Stop reason: HOSPADM

## 2021-10-09 ENCOUNTER — HOSPITAL ENCOUNTER (OUTPATIENT)
Age: 31
Discharge: HOME | End: 2021-10-09
Attending: OBSTETRICS & GYNECOLOGY | Admitting: OBSTETRICS & GYNECOLOGY
Payer: COMMERCIAL

## 2021-10-09 VITALS
DIASTOLIC BLOOD PRESSURE: 77 MMHG | RESPIRATION RATE: 16 BRPM | HEART RATE: 87 BPM | OXYGEN SATURATION: 99 % | TEMPERATURE: 98.5 F | SYSTOLIC BLOOD PRESSURE: 127 MMHG

## 2021-10-09 PROCEDURE — 59025 FETAL NON-STRESS TEST: CPT

## 2021-10-09 PROCEDURE — 59025 FETAL NON-STRESS TEST: CPT | Performed by: OBSTETRICS & GYNECOLOGY

## 2021-10-09 NOTE — PROGRESS NOTES
Dr Laz Alvarez at bedside, NST reactive, nurse will do sve just to see what cervix is and if not dilated, ok to discharge. Pt has appt for nst and bpp Tuesday. S/s of labor discussed.

## 2021-10-09 NOTE — PROGRESS NOTES
NST for IDDM GDM  FHR: 145's, moderate variability, +accels, -decels  Ctx: some uterine irritability   Cat 1      Cx: FT, 50%      She is scheduled for BPP in 3 days     DC home  Counseled about fetal kick count     Gifty Malloy MD

## 2021-10-11 ENCOUNTER — HOSPITAL ENCOUNTER (OUTPATIENT)
Age: 31
Discharge: HOME | End: 2021-10-11
Attending: OBSTETRICS & GYNECOLOGY | Admitting: OBSTETRICS & GYNECOLOGY
Payer: COMMERCIAL

## 2021-10-11 VITALS
HEART RATE: 85 BPM | SYSTOLIC BLOOD PRESSURE: 122 MMHG | TEMPERATURE: 98.3 F | RESPIRATION RATE: 18 BRPM | DIASTOLIC BLOOD PRESSURE: 68 MMHG

## 2021-10-11 LAB
AMPHETAMINE SCREEN, URINE: NORMAL
BARBITURATE SCREEN URINE: NORMAL
BASOPHILS ABSOLUTE: 0.1 K/UL (ref 0–0.2)
BASOPHILS RELATIVE PERCENT: 0.6 %
BENZODIAZEPINE SCREEN, URINE: NORMAL
BILIRUBIN URINE: NEGATIVE
BLOOD, URINE: NEGATIVE
CANNABINOID SCREEN URINE: NORMAL
CLARITY: CLEAR
COCAINE METABOLITE SCREEN URINE: NORMAL
COLOR: YELLOW
CREATININE URINE: 100.3 MG/DL
EOSINOPHILS ABSOLUTE: 0.1 K/UL (ref 0–0.7)
EOSINOPHILS RELATIVE PERCENT: 0.9 %
GLUCOSE URINE: NEGATIVE MG/DL
HCT VFR BLD CALC: 34.9 % (ref 37–47)
HEMOGLOBIN: 11.6 G/DL (ref 12–16)
KETONES, URINE: NEGATIVE MG/DL
LEUKOCYTE ESTERASE, URINE: NEGATIVE
LYMPHOCYTES ABSOLUTE: 2.4 K/UL (ref 1–4.8)
LYMPHOCYTES RELATIVE PERCENT: 16.1 %
Lab: NORMAL
MCH RBC QN AUTO: 26.6 PG (ref 27–31.3)
MCHC RBC AUTO-ENTMCNC: 33.2 % (ref 33–37)
MCV RBC AUTO: 79.9 FL (ref 82–100)
METHADONE SCREEN, URINE: NORMAL
MONOCYTES ABSOLUTE: 0.7 K/UL (ref 0.2–0.8)
MONOCYTES RELATIVE PERCENT: 4.9 %
NEUTROPHILS ABSOLUTE: 11.4 K/UL (ref 1.4–6.5)
NEUTROPHILS RELATIVE PERCENT: 77.5 %
NITRITE, URINE: NEGATIVE
OPIATE SCREEN URINE: NORMAL
OXYCODONE URINE: NORMAL
PDW BLD-RTO: 13.4 % (ref 11.5–14.5)
PH UA: 6 (ref 5–9)
PHENCYCLIDINE SCREEN URINE: NORMAL
PLATELET # BLD: 252 K/UL (ref 130–400)
PROPOXYPHENE SCREEN: NORMAL
PROTEIN PROTEIN: 21 MG/DL
PROTEIN UA: NEGATIVE MG/DL
PROTEIN/CREAT RATIO: 0.2 ML/ML
PROTEIN/CREAT RATIO: 0.2 ML/ML (ref 0–0.2)
RBC # BLD: 4.37 M/UL (ref 4.2–5.4)
SPECIFIC GRAVITY UA: 1.01 (ref 1–1.03)
URIC ACID, SERUM: 5 MG/DL (ref 2.4–5.7)
UROBILINOGEN, URINE: 0.2 E.U./DL
WBC # BLD: 14.7 K/UL (ref 4.8–10.8)

## 2021-10-11 PROCEDURE — 80307 DRUG TEST PRSMV CHEM ANLYZR: CPT

## 2021-10-11 PROCEDURE — 81003 URINALYSIS AUTO W/O SCOPE: CPT

## 2021-10-11 PROCEDURE — 2580000003 HC RX 258: Performed by: OBSTETRICS & GYNECOLOGY

## 2021-10-11 PROCEDURE — 85025 COMPLETE CBC W/AUTO DIFF WBC: CPT

## 2021-10-11 PROCEDURE — 99283 EMERGENCY DEPT VISIT LOW MDM: CPT

## 2021-10-11 PROCEDURE — 84550 ASSAY OF BLOOD/URIC ACID: CPT

## 2021-10-11 PROCEDURE — 99214 OFFICE O/P EST MOD 30 MIN: CPT | Performed by: OBSTETRICS & GYNECOLOGY

## 2021-10-11 PROCEDURE — 80053 COMPREHEN METABOLIC PANEL: CPT

## 2021-10-11 PROCEDURE — 84156 ASSAY OF PROTEIN URINE: CPT

## 2021-10-11 PROCEDURE — 6370000000 HC RX 637 (ALT 250 FOR IP): Performed by: OBSTETRICS & GYNECOLOGY

## 2021-10-11 RX ORDER — BUTALBITAL, ACETAMINOPHEN AND CAFFEINE 300; 40; 50 MG/1; MG/1; MG/1
1 CAPSULE ORAL EVERY 4 HOURS PRN
Qty: 48 CAPSULE | Refills: 1 | Status: SHIPPED | OUTPATIENT
Start: 2021-10-11

## 2021-10-11 RX ORDER — HEPARIN SODIUM 10000 [USP'U]/ML
10000 INJECTION, SOLUTION INTRAVENOUS; SUBCUTANEOUS EVERY 12 HOURS
Qty: 42 ML | Refills: 0 | Status: ON HOLD | OUTPATIENT
Start: 2021-10-11 | End: 2021-10-25

## 2021-10-11 RX ORDER — SODIUM CHLORIDE 9 MG/ML
INJECTION, SOLUTION INTRAVENOUS CONTINUOUS
Status: DISCONTINUED | OUTPATIENT
Start: 2021-10-11 | End: 2021-10-12 | Stop reason: HOSPADM

## 2021-10-11 RX ORDER — BUTALBITAL, ACETAMINOPHEN AND CAFFEINE 300; 40; 50 MG/1; MG/1; MG/1
1 CAPSULE ORAL EVERY 4 HOURS PRN
Status: DISCONTINUED | OUTPATIENT
Start: 2021-10-11 | End: 2021-10-12 | Stop reason: HOSPADM

## 2021-10-11 RX ADMIN — BUTALBITAL, ACETAMINOPHEN AND CAFFEINE 1 CAPSULE: 300; 40; 50 CAPSULE ORAL at 20:21

## 2021-10-11 RX ADMIN — SODIUM CHLORIDE: 9 INJECTION, SOLUTION INTRAVENOUS at 19:56

## 2021-10-11 NOTE — FLOWSHEET NOTE
Received ambulatory to [facility] with C/O nausea, dizziness, headache and \"just not feeling good. \" UA obtained and POC discussed.
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
## 2021-10-12 ENCOUNTER — HOSPITAL ENCOUNTER (OUTPATIENT)
Dept: ULTRASOUND IMAGING | Age: 31
Discharge: HOME | End: 2021-10-14
Payer: COMMERCIAL

## 2021-10-12 DIAGNOSIS — O24.419 GESTATIONAL DIABETES MELLITUS (GDM) IN THIRD TRIMESTER, GESTATIONAL DIABETES METHOD OF CONTROL UNSPECIFIED: ICD-10-CM

## 2021-10-12 LAB
ALBUMIN SERPL-MCNC: 3.5 G/DL (ref 3.5–4.6)
ALP BLD-CCNC: 558 U/L (ref 40–130)
ALT SERPL-CCNC: <5 U/L (ref 0–33)
ANION GAP SERPL CALCULATED.3IONS-SCNC: 11 MEQ/L (ref 9–15)
AST SERPL-CCNC: 7 U/L (ref 0–35)
BILIRUB SERPL-MCNC: <0.2 MG/DL (ref 0.2–0.7)
BUN BLDV-MCNC: 10 MG/DL (ref 6–20)
CALCIUM SERPL-MCNC: 9.1 MG/DL (ref 8.5–9.9)
CHLORIDE BLD-SCNC: 100 MEQ/L (ref 95–107)
CO2: 18 MEQ/L (ref 20–31)
CREAT SERPL-MCNC: 0.6 MG/DL (ref 0.5–0.9)
GFR AFRICAN AMERICAN: >60
GFR NON-AFRICAN AMERICAN: >60
GLOBULIN: 3.6 G/DL (ref 2.3–3.5)
GLUCOSE BLD-MCNC: 71 MG/DL (ref 70–99)
POTASSIUM SERPL-SCNC: 4.2 MEQ/L (ref 3.4–4.9)
SODIUM BLD-SCNC: 129 MEQ/L (ref 135–144)
TOTAL PROTEIN: 7.1 G/DL (ref 6.3–8)

## 2021-10-12 PROCEDURE — 99283 EMERGENCY DEPT VISIT LOW MDM: CPT

## 2021-10-12 PROCEDURE — 76819 FETAL BIOPHYS PROFIL W/O NST: CPT

## 2021-10-13 ENCOUNTER — ROUTINE PRENATAL (OUTPATIENT)
Dept: OBGYN CLINIC | Age: 31
End: 2021-10-13

## 2021-10-13 VITALS — DIASTOLIC BLOOD PRESSURE: 78 MMHG | BODY MASS INDEX: 46.35 KG/M2 | SYSTOLIC BLOOD PRESSURE: 132 MMHG | WEIGHT: 270 LBS

## 2021-10-13 DIAGNOSIS — Z3A.36 36 WEEKS GESTATION OF PREGNANCY: Primary | ICD-10-CM

## 2021-10-13 PROCEDURE — 0502F SUBSEQUENT PRENATAL CARE: CPT | Performed by: OBSTETRICS & GYNECOLOGY

## 2021-10-13 NOTE — PROGRESS NOTES
Patient's last menstrual period was 01/27/2021 (exact date). Please reference prenatal and OB flow chart for further information  PT here today for routine prenatal care  Pt endorses fetal movement and denies loss of fluid, contractions or vaginal bleeding  Pt without complaints  ROS:  Pt denies headache, dysuria, nausea/vomiting  PE:  /78   Wt 270 lb (122.5 kg)   LMP 01/27/2021 (Exact Date)   BMI 46.35 kg/m²   Gen - Alert and oriented x 3  HEENT- NC/AT, CVS - RRR, Lungs - CTAB  Abd - FH Appropriate fetal growth  LE no edema  Reassuring fetal status at this time     Diagnosis Orders   1. 36 weeks gestation of pregnancy  Culture, Strep B Screen, Vaginal/Rectal     Blood sugars reviewed and are normal no concerns at this time  Heparin ordered will begin in am  Pt desires to proceed with scheduled ltcs will due at 38 weeks due to co-morbidities  Upon completion of the visit all questions were answered and the patients follow-up and testing schedule were reviewed.

## 2021-10-16 ENCOUNTER — HOSPITAL ENCOUNTER (OUTPATIENT)
Age: 31
Discharge: HOME | End: 2021-10-16
Attending: OBSTETRICS & GYNECOLOGY | Admitting: OBSTETRICS & GYNECOLOGY
Payer: COMMERCIAL

## 2021-10-16 VITALS
DIASTOLIC BLOOD PRESSURE: 82 MMHG | SYSTOLIC BLOOD PRESSURE: 137 MMHG | HEART RATE: 87 BPM | RESPIRATION RATE: 16 BRPM | TEMPERATURE: 97.8 F

## 2021-10-16 PROCEDURE — 59025 FETAL NON-STRESS TEST: CPT

## 2021-10-16 PROCEDURE — 59025 FETAL NON-STRESS TEST: CPT | Performed by: OBSTETRICS & GYNECOLOGY

## 2021-10-16 NOTE — PROGRESS NOTES
27 y.o. [id number] @ 36w4d   Here for  testing    The indication for antepartum testing is Gestational diabetes    Fetal Heart Rate Baseline:  140    Accelerations greater than or equal to 2 accelerations in 20 minutes? Yes    Accelerations greater than or equal to 15 BPM above baseline, for at least 15 seconds for gestations >32 weeks? oAccelerations greater than or equal to 10 BPM above baseline, for at least 10 seconds for gestations <32 weeks?   (36w4d )  yes    Decelerations: none  Contractions: no    Acoustic Stimulator: no  Uterine Irritability: no    TEST RESULTS  NST reactive    Comments: none    Recommendations: Repeat in 3 days      Lane Gallegos MBA, FACOOG  2021 1:46 PM

## 2021-10-19 ENCOUNTER — HOSPITAL ENCOUNTER (OUTPATIENT)
Age: 31
Discharge: HOME | End: 2021-10-19
Attending: OBSTETRICS & GYNECOLOGY | Admitting: OBSTETRICS & GYNECOLOGY
Payer: COMMERCIAL

## 2021-10-19 ENCOUNTER — HOSPITAL ENCOUNTER (OUTPATIENT)
Dept: ULTRASOUND IMAGING | Age: 31
Discharge: HOME | End: 2021-10-21
Payer: COMMERCIAL

## 2021-10-19 VITALS
TEMPERATURE: 98.7 F | RESPIRATION RATE: 20 BRPM | HEART RATE: 82 BPM | SYSTOLIC BLOOD PRESSURE: 131 MMHG | DIASTOLIC BLOOD PRESSURE: 88 MMHG

## 2021-10-19 DIAGNOSIS — O24.410 DIET CONTROLLED GESTATIONAL DIABETES MELLITUS (GDM) IN THIRD TRIMESTER: ICD-10-CM

## 2021-10-19 DIAGNOSIS — O24.415 GESTATIONAL DIABETES MELLITUS (GDM) CONTROLLED ON ORAL HYPOGLYCEMIC DRUG, ANTEPARTUM: Primary | ICD-10-CM

## 2021-10-19 DIAGNOSIS — O09.93 SUPERVISION OF HIGH RISK PREGNANCY IN THIRD TRIMESTER: ICD-10-CM

## 2021-10-19 PROCEDURE — 76819 FETAL BIOPHYS PROFIL W/O NST: CPT

## 2021-10-19 PROCEDURE — 59025 FETAL NON-STRESS TEST: CPT

## 2021-10-19 PROCEDURE — 59025 FETAL NON-STRESS TEST: CPT | Performed by: OBSTETRICS & GYNECOLOGY

## 2021-10-19 NOTE — PROGRESS NOTES
NST for GDM  FHR: 140's, moderate variability, +accels, -decels  Ctx: none  Cat 1  BP 8/8    Mary Kohler MD

## 2021-10-20 ENCOUNTER — ROUTINE PRENATAL (OUTPATIENT)
Dept: OBGYN CLINIC | Age: 31
End: 2021-10-20

## 2021-10-20 VITALS
DIASTOLIC BLOOD PRESSURE: 82 MMHG | WEIGHT: 273 LBS | HEART RATE: 73 BPM | BODY MASS INDEX: 46.86 KG/M2 | SYSTOLIC BLOOD PRESSURE: 112 MMHG

## 2021-10-20 DIAGNOSIS — Z3A.37 37 WEEKS GESTATION OF PREGNANCY: Primary | ICD-10-CM

## 2021-10-20 DIAGNOSIS — O09.893 SUPERVISION OF OTHER HIGH RISK PREGNANCIES, THIRD TRIMESTER: ICD-10-CM

## 2021-10-20 DIAGNOSIS — O24.414 INSULIN CONTROLLED GESTATIONAL DIABETES MELLITUS (GDM) IN THIRD TRIMESTER: ICD-10-CM

## 2021-10-20 PROCEDURE — 0502F SUBSEQUENT PRENATAL CARE: CPT | Performed by: OBSTETRICS & GYNECOLOGY

## 2021-10-20 NOTE — PROGRESS NOTES
Patient's last menstrual period was 01/27/2021 (exact date). Please reference prenatal and OB flow chart for further information  PT here today for routine prenatal care  Pt endorses fetal movement and denies loss of fluid, contractions or vaginal bleeding  Pt without complaints  ROS:  Pt denies headache, dysuria, nausea/vomiting  PE:  /82   Pulse 73   Wt 273 lb (123.8 kg)   LMP 01/27/2021 (Exact Date)   BMI 46.86 kg/m²   Gen - Alert and oriented x 3  HEENT- NC/AT, CVS - RRR, Lungs - CTAB  Abd - FH Appropriate fetal growth  LE no edema  Reassuring fetal status at this time     Diagnosis Orders   1. 37 weeks gestation of pregnancy     2. Supervision of other high risk pregnancies, third trimester     3. Insulin controlled gestational diabetes mellitus (GDM) in third trimester         Upon completion of the visit all questions were answered and the patients follow-up and testing schedule were reviewed.     Feeling nauseated and fatigue will have off work today

## 2021-10-24 ENCOUNTER — ANESTHESIA (OUTPATIENT)
Dept: LABOR AND DELIVERY | Age: 31
End: 2021-10-24
Payer: COMMERCIAL

## 2021-10-24 ENCOUNTER — HOSPITAL ENCOUNTER (EMERGENCY)
Age: 31
Discharge: ANOTHER HOSPITAL | End: 2021-10-24
Payer: COMMERCIAL

## 2021-10-24 ENCOUNTER — HOSPITAL ENCOUNTER (INPATIENT)
Age: 31
LOS: 2 days | Discharge: HOME | End: 2021-10-27
Attending: OBSTETRICS & GYNECOLOGY | Admitting: OBSTETRICS & GYNECOLOGY
Payer: COMMERCIAL

## 2021-10-24 VITALS
SYSTOLIC BLOOD PRESSURE: 123 MMHG | RESPIRATION RATE: 16 BRPM | OXYGEN SATURATION: 99 % | BODY MASS INDEX: 46.44 KG/M2 | DIASTOLIC BLOOD PRESSURE: 80 MMHG | TEMPERATURE: 97.8 F | WEIGHT: 272 LBS | HEIGHT: 64 IN | HEART RATE: 95 BPM

## 2021-10-24 DIAGNOSIS — R11.0 NAUSEA: ICD-10-CM

## 2021-10-24 DIAGNOSIS — G89.18 POSTOPERATIVE PAIN: Primary | ICD-10-CM

## 2021-10-24 DIAGNOSIS — R51.9 ACUTE INTRACTABLE HEADACHE, UNSPECIFIED HEADACHE TYPE: ICD-10-CM

## 2021-10-24 DIAGNOSIS — O14.93 PRE-ECLAMPSIA IN THIRD TRIMESTER: Primary | ICD-10-CM

## 2021-10-24 LAB
ABO/RH: NORMAL
ALBUMIN SERPL-MCNC: 3.5 G/DL (ref 3.5–4.6)
ALP BLD-CCNC: 773 U/L (ref 40–130)
ALT SERPL-CCNC: 6 U/L (ref 0–33)
ANION GAP SERPL CALCULATED.3IONS-SCNC: 16 MEQ/L (ref 9–15)
ANTIBODY SCREEN: NORMAL
APTT: 33.5 SEC (ref 24.4–36.8)
AST SERPL-CCNC: 11 U/L (ref 0–35)
BASOPHILS ABSOLUTE: 0.2 K/UL (ref 0–0.2)
BASOPHILS RELATIVE PERCENT: 1.5 %
BILIRUB SERPL-MCNC: <0.2 MG/DL (ref 0.2–0.7)
BILIRUBIN URINE: NEGATIVE
BLOOD, URINE: NEGATIVE
BUN BLDV-MCNC: 12 MG/DL (ref 6–20)
CALCIUM SERPL-MCNC: 9.4 MG/DL (ref 8.5–9.9)
CHLORIDE BLD-SCNC: 99 MEQ/L (ref 95–107)
CLARITY: CLEAR
CO2: 17 MEQ/L (ref 20–31)
COLOR: YELLOW
CREAT SERPL-MCNC: 0.64 MG/DL (ref 0.5–0.9)
CREATININE URINE: 86.2 MG/DL
EOSINOPHILS ABSOLUTE: 0.2 K/UL (ref 0–0.7)
EOSINOPHILS RELATIVE PERCENT: 1.1 %
GFR AFRICAN AMERICAN: >60
GFR NON-AFRICAN AMERICAN: >60
GLOBULIN: 3.9 G/DL (ref 2.3–3.5)
GLUCOSE BLD-MCNC: 86 MG/DL (ref 60–115)
GLUCOSE BLD-MCNC: 88 MG/DL (ref 60–115)
GLUCOSE BLD-MCNC: 88 MG/DL (ref 70–99)
GLUCOSE BLD-MCNC: 90 MG/DL (ref 60–115)
GLUCOSE URINE: NEGATIVE MG/DL
HCT VFR BLD CALC: 36.3 % (ref 37–47)
HEMOGLOBIN: 12 G/DL (ref 12–16)
HEPATITIS B SURFACE ANTIGEN INTERPRETATION: NORMAL
INR BLD: 1
KETONES, URINE: NEGATIVE MG/DL
LEUKOCYTE ESTERASE, URINE: NEGATIVE
LYMPHOCYTES ABSOLUTE: 2.5 K/UL (ref 1–4.8)
LYMPHOCYTES RELATIVE PERCENT: 17.5 %
MCH RBC QN AUTO: 26 PG (ref 27–31.3)
MCHC RBC AUTO-ENTMCNC: 33.1 % (ref 33–37)
MCV RBC AUTO: 78.5 FL (ref 82–100)
MONOCYTES ABSOLUTE: 0.9 K/UL (ref 0.2–0.8)
MONOCYTES RELATIVE PERCENT: 6.1 %
NEUTROPHILS ABSOLUTE: 10.4 K/UL (ref 1.4–6.5)
NEUTROPHILS RELATIVE PERCENT: 73.8 %
NITRITE, URINE: NEGATIVE
PDW BLD-RTO: 14.1 % (ref 11.5–14.5)
PERFORMED ON: NORMAL
PH UA: 6 (ref 5–9)
PLATELET # BLD: 325 K/UL (ref 130–400)
POTASSIUM SERPL-SCNC: 4.3 MEQ/L (ref 3.4–4.9)
PROTEIN PROTEIN: 28 MG/DL
PROTEIN UA: ABNORMAL MG/DL
PROTEIN/CREAT RATIO: 0.3 ML/ML
PROTEIN/CREAT RATIO: 0.3 ML/ML (ref 0–0.2)
PROTHROMBIN TIME: 13 SEC (ref 12.3–14.9)
RBC # BLD: 4.62 M/UL (ref 4.2–5.4)
SARS-COV-2, NAAT: NOT DETECTED
SODIUM BLD-SCNC: 132 MEQ/L (ref 135–144)
SPECIFIC GRAVITY UA: 1.02 (ref 1–1.03)
TOTAL PROTEIN: 7.4 G/DL (ref 6.3–8)
UROBILINOGEN, URINE: 0.2 E.U./DL
WBC # BLD: 14.1 K/UL (ref 4.8–10.8)

## 2021-10-24 PROCEDURE — 85730 THROMBOPLASTIN TIME PARTIAL: CPT

## 2021-10-24 PROCEDURE — 87340 HEPATITIS B SURFACE AG IA: CPT

## 2021-10-24 PROCEDURE — 86900 BLOOD TYPING SEROLOGIC ABO: CPT

## 2021-10-24 PROCEDURE — 2500000003 HC RX 250 WO HCPCS: Performed by: OBSTETRICS & GYNECOLOGY

## 2021-10-24 PROCEDURE — 86850 RBC ANTIBODY SCREEN: CPT

## 2021-10-24 PROCEDURE — 87635 SARS-COV-2 COVID-19 AMP PRB: CPT

## 2021-10-24 PROCEDURE — 84156 ASSAY OF PROTEIN URINE: CPT

## 2021-10-24 PROCEDURE — 59025 FETAL NON-STRESS TEST: CPT | Performed by: OBSTETRICS & GYNECOLOGY

## 2021-10-24 PROCEDURE — 86592 SYPHILIS TEST NON-TREP QUAL: CPT

## 2021-10-24 PROCEDURE — 99221 1ST HOSP IP/OBS SF/LOW 40: CPT | Performed by: OBSTETRICS & GYNECOLOGY

## 2021-10-24 PROCEDURE — 85610 PROTHROMBIN TIME: CPT

## 2021-10-24 PROCEDURE — 86593 SYPHILIS TEST NON-TREP QUANT: CPT

## 2021-10-24 PROCEDURE — 85025 COMPLETE CBC W/AUTO DIFF WBC: CPT

## 2021-10-24 PROCEDURE — 6360000002 HC RX W HCPCS: Performed by: OBSTETRICS & GYNECOLOGY

## 2021-10-24 PROCEDURE — 81003 URINALYSIS AUTO W/O SCOPE: CPT

## 2021-10-24 PROCEDURE — 99284 EMERGENCY DEPT VISIT MOD MDM: CPT

## 2021-10-24 PROCEDURE — 86901 BLOOD TYPING SEROLOGIC RH(D): CPT

## 2021-10-24 PROCEDURE — 6370000000 HC RX 637 (ALT 250 FOR IP): Performed by: OBSTETRICS & GYNECOLOGY

## 2021-10-24 PROCEDURE — 80053 COMPREHEN METABOLIC PANEL: CPT

## 2021-10-24 PROCEDURE — 2580000003 HC RX 258: Performed by: OBSTETRICS & GYNECOLOGY

## 2021-10-24 PROCEDURE — 6360000002 HC RX W HCPCS

## 2021-10-24 RX ORDER — SODIUM CHLORIDE 0.9 % (FLUSH) 0.9 %
10 SYRINGE (ML) INJECTION PRN
Status: DISCONTINUED | OUTPATIENT
Start: 2021-10-24 | End: 2021-10-25

## 2021-10-24 RX ORDER — ONDANSETRON 2 MG/ML
4 INJECTION INTRAMUSCULAR; INTRAVENOUS EVERY 6 HOURS PRN
Status: DISCONTINUED | OUTPATIENT
Start: 2021-10-24 | End: 2021-10-25

## 2021-10-24 RX ORDER — NICOTINE POLACRILEX 4 MG
15 LOZENGE BUCCAL PRN
Status: DISCONTINUED | OUTPATIENT
Start: 2021-10-24 | End: 2021-10-25

## 2021-10-24 RX ORDER — SODIUM CHLORIDE 9 MG/ML
25 INJECTION, SOLUTION INTRAVENOUS PRN
Status: DISCONTINUED | OUTPATIENT
Start: 2021-10-24 | End: 2021-10-25

## 2021-10-24 RX ORDER — SODIUM CHLORIDE, SODIUM LACTATE, POTASSIUM CHLORIDE, CALCIUM CHLORIDE 600; 310; 30; 20 MG/100ML; MG/100ML; MG/100ML; MG/100ML
INJECTION, SOLUTION INTRAVENOUS CONTINUOUS
Status: DISCONTINUED | OUTPATIENT
Start: 2021-10-24 | End: 2021-10-25

## 2021-10-24 RX ORDER — ACETAMINOPHEN 325 MG/1
650 TABLET ORAL EVERY 4 HOURS PRN
Status: DISCONTINUED | OUTPATIENT
Start: 2021-10-24 | End: 2021-10-25

## 2021-10-24 RX ORDER — BUTALBITAL, ACETAMINOPHEN AND CAFFEINE 300; 40; 50 MG/1; MG/1; MG/1
2 CAPSULE ORAL EVERY 4 HOURS PRN
Status: DISCONTINUED | OUTPATIENT
Start: 2021-10-24 | End: 2021-10-25

## 2021-10-24 RX ORDER — ONDANSETRON 2 MG/ML
4 INJECTION INTRAMUSCULAR; INTRAVENOUS EVERY 6 HOURS PRN
Status: DISCONTINUED | OUTPATIENT
Start: 2021-10-24 | End: 2021-10-24 | Stop reason: SDUPTHER

## 2021-10-24 RX ORDER — ONDANSETRON 4 MG/1
4 TABLET, ORALLY DISINTEGRATING ORAL EVERY 8 HOURS PRN
Status: DISCONTINUED | OUTPATIENT
Start: 2021-10-24 | End: 2021-10-25

## 2021-10-24 RX ORDER — DEXTROSE MONOHYDRATE 50 MG/ML
100 INJECTION, SOLUTION INTRAVENOUS PRN
Status: DISCONTINUED | OUTPATIENT
Start: 2021-10-24 | End: 2021-10-25

## 2021-10-24 RX ORDER — METOCLOPRAMIDE HYDROCHLORIDE 5 MG/ML
10 INJECTION INTRAMUSCULAR; INTRAVENOUS EVERY 6 HOURS
Status: DISCONTINUED | OUTPATIENT
Start: 2021-10-24 | End: 2021-10-25

## 2021-10-24 RX ORDER — SODIUM CHLORIDE, SODIUM LACTATE, POTASSIUM CHLORIDE, AND CALCIUM CHLORIDE .6; .31; .03; .02 G/100ML; G/100ML; G/100ML; G/100ML
1000 INJECTION, SOLUTION INTRAVENOUS ONCE
Status: DISCONTINUED | OUTPATIENT
Start: 2021-10-24 | End: 2021-10-25

## 2021-10-24 RX ORDER — ONDANSETRON 2 MG/ML
INJECTION INTRAMUSCULAR; INTRAVENOUS
Status: COMPLETED
Start: 2021-10-24 | End: 2021-10-24

## 2021-10-24 RX ORDER — NALBUPHINE HCL 10 MG/ML
AMPUL (ML) INJECTION
Status: COMPLETED
Start: 2021-10-24 | End: 2021-10-24

## 2021-10-24 RX ORDER — BUTALBITAL, ACETAMINOPHEN AND CAFFEINE 300; 40; 50 MG/1; MG/1; MG/1
2 CAPSULE ORAL ONCE
Status: COMPLETED | OUTPATIENT
Start: 2021-10-24 | End: 2021-10-24

## 2021-10-24 RX ORDER — NALBUPHINE HCL 10 MG/ML
10 AMPUL (ML) INJECTION
Status: DISCONTINUED | OUTPATIENT
Start: 2021-10-24 | End: 2021-10-24 | Stop reason: SDUPTHER

## 2021-10-24 RX ORDER — DEXTROSE MONOHYDRATE 25 G/50ML
12.5 INJECTION, SOLUTION INTRAVENOUS PRN
Status: DISCONTINUED | OUTPATIENT
Start: 2021-10-24 | End: 2021-10-25

## 2021-10-24 RX ORDER — NALBUPHINE HCL 10 MG/ML
10 AMPUL (ML) INJECTION EVERY 4 HOURS PRN
Status: DISCONTINUED | OUTPATIENT
Start: 2021-10-24 | End: 2021-10-25

## 2021-10-24 RX ADMIN — BUTALBITAL, ACETAMINOPHEN AND CAFFEINE 2 CAPSULE: 300; 40; 50 CAPSULE ORAL at 16:26

## 2021-10-24 RX ADMIN — SODIUM CHLORIDE, PRESERVATIVE FREE 10 ML: 5 INJECTION INTRAVENOUS at 15:25

## 2021-10-24 RX ADMIN — BUTALBITA,ACETAMINOPHEN AND CAFFEINE 2 CAPSULE: 50; 300; 40 CAPSULE ORAL at 23:04

## 2021-10-24 RX ADMIN — NALBUPHINE HYDROCHLORIDE 10 MG: 10 INJECTION, SOLUTION INTRAMUSCULAR; INTRAVENOUS; SUBCUTANEOUS at 09:45

## 2021-10-24 RX ADMIN — ONDANSETRON 4 MG: 2 INJECTION INTRAMUSCULAR; INTRAVENOUS at 09:44

## 2021-10-24 RX ADMIN — NALBUPHINE HYDROCHLORIDE 10 MG: 10 INJECTION, SOLUTION INTRAMUSCULAR; INTRAVENOUS; SUBCUTANEOUS at 15:13

## 2021-10-24 RX ADMIN — NALBUPHINE HYDROCHLORIDE 10 MG: 10 INJECTION, SOLUTION INTRAMUSCULAR; INTRAVENOUS; SUBCUTANEOUS at 21:57

## 2021-10-24 RX ADMIN — FAMOTIDINE 20 MG: 10 INJECTION, SOLUTION INTRAVENOUS at 15:19

## 2021-10-24 ASSESSMENT — PAIN SCALES - GENERAL
PAINLEVEL_OUTOF10: 10
PAINLEVEL_OUTOF10: 9
PAINLEVEL_OUTOF10: 6
PAINLEVEL_OUTOF10: 7

## 2021-10-24 ASSESSMENT — ENCOUNTER SYMPTOMS
ABDOMINAL PAIN: 0
COUGH: 0
PHOTOPHOBIA: 0
VOMITING: 0
DIARRHEA: 0
NAUSEA: 1
SHORTNESS OF BREATH: 0

## 2021-10-24 ASSESSMENT — PAIN DESCRIPTION - DESCRIPTORS: DESCRIPTORS: ACHING

## 2021-10-24 ASSESSMENT — PAIN DESCRIPTION - PAIN TYPE: TYPE: ACUTE PAIN

## 2021-10-24 ASSESSMENT — PAIN DESCRIPTION - LOCATION: LOCATION: HEAD

## 2021-10-24 NOTE — PROGRESS NOTES
Pt medciated with zofran for nausea and nubain for headache.  Lights turned off and pt encouraged to rest. Will call  with lab results

## 2021-10-24 NOTE — FLOWSHEET NOTE
Dr. Bender Furnish in to see patient. OK to shower. Monitors taken off. IV site covered. Patient Up to shower. Tolerating all well.

## 2021-10-24 NOTE — FLOWSHEET NOTE
Dr. Jarett Frazier is called and informed of patients headache and left side pain. He is aware of her blood pressures which are wnl and is aware she received Nubain 10 mg IVP within the hours. Verbalized understanding. Orders received.

## 2021-10-24 NOTE — PROGRESS NOTES
Dr Canda Pallas at bedside, aware of santo helpled h/a some now 7/10, aware of repeat BP, plan observation, 24 hour urine, notify milan Fuentes for nst q shift, and diabetic diet for now with insulin coverage. Check with Dr Carlos Jordan for plan d/t pt taking heparin injections.

## 2021-10-24 NOTE — ED TRIAGE NOTES
Pt arrived to ed from home. Pt states she is 37 weeks pregnant. Pt states she has a headache and that she is nauseous and has blurry vision. Pt was able to walk back to room from lobby. Pt is alert and oriented x 4. Pt denies sob. And chest pain.

## 2021-10-24 NOTE — PROGRESS NOTES
Dr Carri Aviles notified of pt c/o, tx, labs, plan admit pt, continue her own home insulin regimen, check with CRNA re: heparin, plan r c/s tomorrow am.

## 2021-10-24 NOTE — ED PROVIDER NOTES
[address] [facility] ED  eMERGENCY dEPARTMENT eNCOUnter      Pt Name: Damion Vines  MRN: [id number]  [place] 1990  Date of evaluation: 10/24/2021  Provider: Rj Lazaro is a 27 y.o. adult per chart review has ah/o hypothyroidism, gestational diabetes, preeclampsia, sleep apnea. Patient is 37 weeks 5 days pregnant with scheduled c/section for this Thursday 10/28. OB/GYN is with [facility] Dr. Robert Armenta. Patient presents to ED with 12 hour history of moderate, constant bifrontal headache and nausea. Also reporting intermittently blurred vision. Patient states fioricet and tylenol did not alleviate headache. Reports most recent NST was at OB/GYN yesterday with normal results and reports observing normal fetal activity in the interim. Denies chest pain, dyspnea, weakness, lower extremity edema, palpitations, vomiting. REVIEW OF SYSTEMS       Review of Systems   Constitutional: Negative for chills and fever. HENT: Negative for congestion. Eyes: Positive for visual disturbance. Negative for photophobia. Respiratory: Negative for cough and shortness of breath. Cardiovascular: Negative for chest pain. Gastrointestinal: Positive for nausea. Negative for abdominal pain, diarrhea and vomiting. Genitourinary: Negative for difficulty urinating. Musculoskeletal: Negative for myalgias. Neurological: Positive for headaches. Negative for dizziness, seizures and light-headedness. Psychiatric/Behavioral: Negative for behavioral problems. Except as noted above the remainder of the review of systems was reviewed and negative.        PAST MEDICAL HISTORY     Past Medical History:   Diagnosis Date    Adrenal insufficiency ([place]'s disease) ([facility] [address]     Anti-phospholipid antibody syndrome ([facility] [address]     Anxiety 01/2017    Depression 01/2017    Diet controlled gestational diabetes mellitus (GDM) in third trimester     Disease of blood and blood forming organ     History of blood transfusion          SURGICAL HISTORY       Past Surgical History:   Procedure Laterality Date     SECTION      GALLBLADDER SURGERY Bilateral 2015    OVARY REMOVAL Left          CURRENT MEDICATIONS       Discharge Medication List as of 10/24/2021  7:20 AM      CONTINUE these medications which have NOT CHANGED    Details   butalbital-APAP-caffeine -40 MG CAPS per capsule Take 1 capsule by mouth every 4 hours as needed for Headaches, Disp-48 capsule, R-1Normal      Heparin Sodium, Porcine, (HEPARIN, PORCINE,) 29622 UNIT/ML injection Inject 1 mL into the skin every 12 hours for 21 days, Disp-42 mL, R-0Normal      glyBURIDE (DIABETA) 2.5 MG tablet Take 1 tablet by mouth 2 times daily (with meals), Disp-60 tablet, R-3Normal      Blood Pressure Monitoring (ADULT BLOOD PRESSURE CUFF LG) KIT 3 TIMES DAILY Starting Wed 10/6/2021, Disp-1 kit, R-0, Normal      blood glucose monitor strips Test blood glucose 4 times daily. , Disp-120 strip, R-3, Normal      blood glucose monitor kit and supplies Test blood glucose 4 times a day, Disp-1 kit, R-0, Normal      Alcohol Swabs (ALCOHOL PREP) 70 % PADS Disp-100 each, R-0, NormalUse to wipe finger before testing blood glucose      insulin regular human (HUMULIN R U-500 KWIKPEN) 500 UNIT/ML SOPN concentrated injection pen Inject 21 Units into the skin every morning (before breakfast) AND 16 Units Daily with supper., Disp-6 each, R-1Normal      insulin regular (HUMULIN R) 100 UNIT/ML injection Inject 21 Units into the skin every morning (before breakfast) AND 16 Units Daily with supper., Disp-10 mL, R-3Normal      insulin NPH (HUMULIN N;NOVOLIN N) 100 UNIT/ML injection pen 16 units every am and 43 units every pm, Disp-5 pen, R-3Normal      Lancets MISC Disp-120 each, R-3, NormalTest blood glucose 4 times daily      !! ondansetron (ZOFRAN ODT) 4 MG disintegrating tablet Take 1 tablet by mouth every 8 hours as needed for Nausea or Vomiting, Disp-30 tablet, R-2Print      PROMETHEGAN 12.5 MG suppository PLACE 1 SUPPOSITORY RECTALLY EVERY 6 HOURS AS NEEDED FOR NAUSEA, DAWHistorical Med      DULoxetine (CYMBALTA) 20 MG extended release capsule Take 1 capsule by mouth 2 times daily, Disp-60 capsule, R-3Normal      QUEtiapine (SEROQUEL) 50 MG tablet Take 1 tablet by mouth every evening, Disp-30 tablet, R-3Normal      propranolol (INDERAL) 10 MG tablet Take 1 tablet by mouth 3 times daily as needed (anxiety), Disp-90 tablet, R-3Normal      doxyLAMINE succinate (GNP SLEEP AID) 25 MG tablet Take 1 tablet by mouth nightly, Disp-30 tablet, R-3Normal      acetaminophen (AMINOFEN) 325 MG tablet Take 2 tablets by mouth every 6 hours as needed for Pain, Disp-40 tablet, R-0Normal      !! ondansetron (ZOFRAN-ODT) 4 MG disintegrating tablet Take 1 tablet by mouth 3 times daily as needed for Nausea or Vomiting, Disp-21 tablet, R-0Normal      buPROPion (WELLBUTRIN XL) 150 MG extended release tablet Take 1 tablet by mouth every morning, Disp-30 tablet,R-3Normal      famotidine (PEPCID) 20 MG tablet Take 1 tablet by mouth 2 times daily, Disp-60 tablet,R-3Normal      hydrOXYzine (VISTARIL) 25 MG capsule take one capsule by mouth 4 times daily as needed for anxietyHistorical Med       !! - Potential duplicate medications found. Please discuss with provider.           ALLERGIES     Amoxicillin, Erythromycin, and Tequin [gatifloxacin]    FAMILY HISTORY       Family History   Problem Relation Age of Onset    Heart Attack Mother     Heart Attack Father     Breast Cancer Neg Hx           SOCIAL HISTORY       Social History     Socioeconomic History    Marital status: Legally      Spouse name: Not on file    Number of children: Not on file    Years of education: Not on file    Highest education level: Not on file   Occupational History    Not on file   Tobacco Use    Smoking status: Former Smoker     Types: Cigarettes     Quit date: 7/3/2015     Years since quittin.3    Smokeless tobacco: Never Used   Vaping Use    Vaping Use: Never used   Substance and Sexual Activity    Alcohol use: No    Drug use: No    Sexual activity: Yes     Partners: Male, Female   Other Topics Concern    Not on file   Social History Narrative    Not on file     Social Determinants of Health     Financial Resource Strain:     Difficulty of Paying Living Expenses:    Food Insecurity:     Worried About Running Out of Food in the Last Year:     [address] in the Last Year:    Transportation Needs:     Lack of Transportation (Medical):  Lack of Transportation (Non-Medical):    Physical Activity:     Days of Exercise per Week:     Minutes of Exercise per Session:    Stress:     Feeling of Stress :    Social Connections:     Frequency of Communication with Friends and Family:     Frequency of Social Gatherings with Friends and Family:     Attends Sikh Services:     Active Member of Clubs or Organizations:     Attends Club or Organization Meetings:     Marital Status:    Intimate Partner Violence:     Fear of Current or Ex-Partner:     Emotionally Abused:     Physically Abused:     Sexually Abused:          PHYSICAL EXAM        ED Triage Vitals [10/24/21 0656]   BP Temp Temp Source Pulse Resp SpO2 Height Weight   (!) 149/96 97.8 °F (36.6 °C) Oral 95 16 99 % 5' 4\" (1.626 m) 272 lb (123.4 kg)       Physical Exam  Constitutional:       General: Rayne Contreras is not in acute distress. Appearance: Normal appearance. HENT:      Head: Normocephalic and atraumatic. Right Ear: External ear normal.      Left Ear: External ear normal.      Nose: Nose normal.      Mouth/Throat:      Mouth: Mucous membranes are moist.      Pharynx: Oropharynx is clear. Eyes:      Extraocular Movements: Extraocular movements intact. Conjunctiva/sclera: Conjunctivae normal.      Pupils: Pupils are equal, round, and reactive to light.    Cardiovascular:      Rate and Rhythm: Normal rate and regular rhythm. Pulmonary:      Effort: Pulmonary effort is normal. No respiratory distress. Breath sounds: Normal breath sounds. No wheezing. Abdominal:      General: Bowel sounds are normal. There is no distension. Palpations: Abdomen is soft. Tenderness: There is no abdominal tenderness. There is no guarding. Musculoskeletal:         General: Normal range of motion. Cervical back: Normal range of motion. Right lower leg: No edema. Left lower leg: No edema. Skin:     General: Skin is warm. Neurological:      Mental Status: Kiley Akins is alert and oriented to person, place, and time. Psychiatric:         Mood and Affect: Mood normal.           LABS:  Labs Reviewed - No data to display      MDM:   Vitals:    Vitals:    10/24/21 0656 10/24/21 0700 10/24/21 0730   BP: (!) 149/96 (!) 124/94 123/80   Pulse: 95     Resp: 16     Temp: 97.8 °F (36.6 °C)     TempSrc: Oral     SpO2: 99% 98% 99%   Weight: 272 lb (123.4 kg)     Height: 5' 4\" (1.626 m)         27 y.o. adult per chart review has ah/o hypothyroidism, gestational diabetes, preeclampsia, sleep apnea. Patient is 37 weeks 5 days pregnant with scheduled c/section for this Thursday 10/28. OB/GYN is Dr. Cadence Amaro. Patient presents to ED with 12 hour history of moderate, constant bifrontal headache and nausea. Also reporting intermittently blurred vision. Patient states fioricet and tylenol did not alleviate headache. Reports most recent NST was at OB/GYN yesterday with normal results and reports normal fetal activity in the interim. Afebrile, hemodynamically stable. Blood pressure 149/96 on arrival, does decrease to 124/94 in ED. HR borderline tachycardic at 95. 99% O2, no oxygen requirement. Case discussed with in house OB/GYN with recommendation for disposition to [facility] labor and delivery ED for further evaluation and care. Discussed plan with patient and partner who agree.          CRITICAL CARE TIME   Total CriticalCare time was 0 minutes, excluding separately reportable procedures. There was a high probability of clinically significant/life threatening deterioration in the patient's condition which required my urgent intervention. PROCEDURES:  Unlessotherwise noted below, none      Procedures      FINAL IMPRESSION      1. Pre-eclampsia in third trimester    2. Acute intractable headache, unspecified headache type    3.  Nausea          DISPOSITION/PLAN   DISPOSITION Decision To Discharge 10/24/2021 07:10:11 AM          Carolee Sow (electronically signed)  Attending Emergency Physician          Obed Guidry 0129

## 2021-10-24 NOTE — PROGRESS NOTES
Dr Babita Michael updated on pt pressures and c/o headache and pt medicated at 0500 with 1500mg of tylenol at home. Will call Dr Gildardo Vasquez to discuss.

## 2021-10-24 NOTE — FLOWSHEET NOTE
Pt. States her pain is currently a 7- headache and new onset of left sided lower back pain. Received Nubain at 1513. No other pain medications ordered at this time.

## 2021-10-24 NOTE — PROGRESS NOTES
Patient admitted in house by Dr Napoleon George for severe headache, mildly elevated BP. Patient has significant history of gestational DM (current), history of preeclampsia with previous pregnancy, heavenly disease, antiphospholipid which she was taking lovenox but was bridged to heparin SQ 10,000 units BID during pregnancy. No history of DVT and does not take blood thinners outside of pregnancy per patient. Last heparin dose of heparin was 10/23/2021 at approximatedly 2000. Patient being admitted for 24 hr urine and observation for possible delivery. Discussed this patient with Dr Essence Howard and Dr Napoleon George. Discussed risk and benefits of holding heparin and possibility of spinal anesthesia vs general anesthesia (with potential difficult intubation r/t MP3, redundant airway tissue, pregnancy) for emergent . All in agreement that benefits of holding heparin outweigh risks. Will hold heparin until after  section.

## 2021-10-24 NOTE — H&P
Labor and Delivery Triage Note        CHIEF COMPLAINT:  Severe headaches, mildly elevated BP     HISTORY OF PRESENT ILLNESS:      The patient is a 27 y.o. female  37w6d. OB History        3    Para   1    Term           1    AB   1    Living   1       SAB        TAB   1    Ectopic        Molar        Multiple        Live Births   1              Patient presents complaining of  Severe headaches, mildly elevated BP   She reports Normal fetal movement. She denies vaginal bleeding. She denies leakage of amniotic fluid     Estimated Due Date:  Estimated Date of Delivery: 21    PAST MEDICAL HISTORY:   Past Medical History:   Diagnosis Date    Ad[facility]'s [facility]) [facility] [address]     Anti-phospholipid antibody syndrome ([facility] [address]     Anxiety 2017    Depression 2017    Diet controlled gestational diabetes mellitus (GDM) in third trimester     Disease of blood and blood forming organ     History of blood transfusion        PAST  SURGICAL HISTORY:   Past Surgical History:   Procedure Laterality Date     SECTION      GALLBLADDER SURGERY Bilateral     OVARY REMOVAL Left        SOCIAL HISTORY:     reports that Felipe Nath quit smoking about 6 years ago. Minor Nath's smoking use included cigarettes. Minor Delgadillo has never used smokeless tobacco. Felipe Delgadillo reports that Minor Delgadillo does not drink alcohol and does not use drugs. MEDICATIONS:    Prior to Admission medications    Medication Sig Start Date End Date Taking? Authorizing Provider   butalbital-APAP-caffeine -40 MG CAPS per capsule Take 1 capsule by mouth every 4 hours as needed for Headaches 10/11/21  Yes Iris Almonte DO   Heparin Sodium, Porcine, (HEPARIN, PORCINE,) 98147 UNIT/ML injection Inject 1 mL into the skin every 12 hours for 21 days 10/11/21 11/1/21 Yes Iris Almonte DO   blood glucose monitor strips Test blood glucose 4 times daily.  10/6/21  Yes Sophia Shaw DO   insulin regular (HUMULIN R) 100 UNIT/ML injection Inject 21 Units into the skin every morning (before breakfast) AND 16 Units Daily with supper. 9/7/21  Yes Sophia Shaw DO   insulin NPH (HUMULIN N;NOVOLIN N) 100 UNIT/ML injection pen 16 units every am and 43 units every pm  Patient taking differently: 18 Units 18 units every am and 49 units every pm 9/1/21  Yes Sophia Shaw DO   ondansetron (ZOFRAN ODT) 4 MG disintegrating tablet Take 1 tablet by mouth every 8 hours as needed for Nausea or Vomiting 7/20/21  Yes Pushpa Uribe MD   acetaminophen (AMINOFEN) 325 MG tablet Take 2 tablets by mouth every 6 hours as needed for Pain 4/8/21  Yes Víctor Mills PA-C   ondansetron (ZOFRAN-ODT) 4 MG disintegrating tablet Take 1 tablet by mouth 3 times daily as needed for Nausea or Vomiting 3/24/21  Yes Sophia Shaw DO   famotidine (PEPCID) 20 MG tablet Take 1 tablet by mouth 2 times daily 10/12/20  Yes Lyudmila Andrews PA-C   glyBURIDE (DIABETA) 2.5 MG tablet Take 1 tablet by mouth 2 times daily (with meals) 10/6/21   Dora Hernandez DO   Blood Pressure Monitoring (ADULT BLOOD PRESSURE CUFF LG) KIT 1 Container by Does not apply route 3 times daily 10/6/21   Sophia Shaw DO   blood glucose monitor kit and supplies Test blood glucose 4 times a day 10/6/21   Sophia Shaw DO   Alcohol Swabs (ALCOHOL PREP) 70 % PADS Use to wipe finger before testing blood glucose 10/6/21   Dora Ortega DO   insulin regular human (HUMULIN R U-500 KWIKPEN) 500 UNIT/ML SOPN concentrated injection pen Inject 21 Units into the skin every morning (before breakfast) AND 16 Units Daily with supper.  9/7/21   Sophia Shaw DO   Lancets MISC Test blood glucose 4 times daily 8/2/21   Dora Hernandez DO   PROMETHEGAN 12.5 MG suppository PLACE 1 SUPPOSITORY RECTALLY EVERY 6 HOURS AS NEEDED FOR NAUSEA 5/18/21   Historical Provider, MD   DULoxetine (CYMBALTA) 20 MG extended release capsule Take 1 capsule by mouth 2 times daily 5/25/21   Lisseth Garcia MD   QUEtiapine (SEROQUEL) 50 MG tablet Take 1 tablet by mouth every evening 5/25/21   Lisseth Garcia MD   propranolol (INDERAL) 10 MG tablet Take 1 tablet by mouth 3 times daily as needed (anxiety) 5/25/21   Lisseth Garcia MD   doxyLAMINE succinate (GNP SLEEP AID) 25 MG tablet Take 1 tablet by mouth nightly 5/25/21   Lisseth Garcia MD   hydrOXYzine (ATARAX) 50 MG tablet Take 1 tablet by mouth every 6 hours as needed for Anxiety (sleep) 11/24/20   Lisseth Garcia MD   buPROPion (WELLBUTRIN XL) 150 MG extended release tablet Take 1 tablet by mouth every morning 10/12/20   Lisseth Garcia MD   DULoxetine (CYMBALTA) 20 MG extended release capsule Take 1 capsule by mouth 2 times daily 10/12/20   Lisseth Garcia MD   hydrOXYzine (VISTARIL) 25 MG capsule take one capsule by mouth 4 times daily as needed for anxiety 8/24/20   Historical Provider, MD        FAMILY HISTORY:   Family History   Problem Relation Age of Onset    Heart Attack Mother     Heart Attack Father     Breast Cancer Neg Hx        DRUG ALLERGIES: Amoxicillin, Erythromycin, and Tequin [gatifloxacin]    PRENATAL CARE:   Complicated by: Jessica Higgins on insulin, antiphospholipid syndrome , hx pre-eclampsia previous pregnancy     REVIEW OF SYSTEMS:     Pertinent items are noted in HPI. PHYSICAL EXAM:    Vital Signs: Blood pressure (!) 134/92, last menstrual period 01/27/2021.     CONSTITUTIONAL:  awake, alert, cooperative, no apparent distress, and appears stated age  LUNGS:  No increased work of breathing, good air exchange, clear to auscultation bilaterally, no crackles or wheezing  CARDIOVASCULAR:  Normal apical impulse, regular rate and rhythm, normal S1 and S2, no S3 or S4, and no murmur noted  ABDOMEN:  No scars, normal bowel sounds, soft, non-distended, non-tender, no masses palpated, no hepatosplenomegally  Cervix:  deferred    Fetal heart rate on NST:  Baseline Heart Rate 140 bpm , accelerations:  present    Contraction frequency on tocometer:  none0 minutes    Membranes:  Intact    General Labs:      Admission on 10/24/2021   Component Date Value Ref Range Status    Color, UA 10/24/2021 Yellow  Straw/Yellow Final    Clarity, UA 10/24/2021 Clear  Clear Final    Glucose, Ur 10/24/2021 Negative  Negative mg/dL Final    Bilirubin Urine 10/24/2021 Negative  Negative Final    Ketones, Urine 10/24/2021 Negative  Negative mg/dL Final    Specific [place], UA 10/24/2021 1.016  1.005 - 1.030 Final    Blood, Urine 10/24/2021 Negative  Negative Final    pH, UA 10/24/2021 6.0  5.0 - 9.0 Final    Protein, UA 10/24/2021 TRACE* Negative mg/dL Final    Urobilinogen, Urine 10/24/2021 0.2  <2.0 E.U./dL Final    Nitrite, Urine 10/24/2021 Negative  Negative Final    Leukocyte Esterase, Urine 10/24/2021 Negative  Negative Final    Protein, Ur 10/24/2021 28.0  Not Established mg/dL Final    Creatinine, Ur 10/24/2021 86.2  Not Established mg/dL Final    Protein/Creat Ratio 10/24/2021 0.3* 0.0 - 0.2 mL/mL Final    Protein/Creat Ratio 10/24/2021 0.3  mL/mL Final    WBC 10/24/2021 14.1* 4.8 - 10.8 K/uL Final    RBC 10/24/2021 4.62  4.20 - 5.40 M/uL Final    Hemoglobin 10/24/2021 12.0  12.0 - 16.0 g/dL Final    Hematocrit 10/24/2021 36.3* 37.0 - 47.0 % Final    MCV 10/24/2021 78.5* 82.0 - 100.0 fL Final    MCH 10/24/2021 26.0* 27.0 - 31.3 pg Final    MCHC 10/24/2021 33.1  33.0 - 37.0 % Final    RDW 10/24/2021 14.1  11.5 - 14.5 % Final    Platelets 43/87/9953 325  130 - 400 K/uL Final    Neutrophils % 10/24/2021 73.8  % Final    Lymphocytes % 10/24/2021 17.5  % Final    Monocytes % 10/24/2021 6.1  % Final    Eosinophils % 10/24/2021 1.1  % Final    Basophils % 10/24/2021 1.5  % Final    Neutrophils Absolute 10/24/2021 10.4* 1.4 - 6.5 K/uL Final    Lymphocytes Absolute 10/24/2021 2.5  1.0 - 4.8 K/uL Final    Monocytes Absolute 10/24/2021 0.9* 0.2 - 0.8 K/uL Final    Eosinophils Absolute 10/24/2021 0.2  0.0 - 0.7 K/uL Final    Basophils Absolute 10/24/2021 0.2  0.0 - 0.2 K/uL Final    Sodium 10/24/2021 132* 135 - 144 mEq/L Final    Potassium 10/24/2021 4.3  3.4 - 4.9 mEq/L Final    Chloride 10/24/2021 99  95 - 107 mEq/L Final    CO2 10/24/2021 17* 20 - 31 mEq/L Final    Anion Gap 10/24/2021 16* 9 - 15 mEq/L Final    Glucose 10/24/2021 88  70 - 99 mg/dL Final    BUN 10/24/2021 12  6 - 20 mg/dL Final    CREATININE 10/24/2021 0.64  0.50 - 0.90 mg/dL Final    GFR Non- 10/24/2021 >60.0  >60 Final    Comment: >60 mL/min/1.73m2 EGFR, calc. for ages 25 and older using the  MDRD formula (not corrected for weight), is valid for stable  renal function.  GFR  10/24/2021 >60.0  >60 Final    Comment: >60 mL/min/1.73m2 EGFR, calc. for ages 25 and older using the  MDRD formula (not corrected for weight), is valid for stable  renal function.  Calcium 10/24/2021 9.4  8.5 - 9.9 mg/dL Final    Total Protein 10/24/2021 7.4  6.3 - 8.0 g/dL Final    Albumin 10/24/2021 3.5  3.5 - 4.6 g/dL Final    Total Bilirubin 10/24/2021 <0.2  0.2 - 0.7 mg/dL Final    Alkaline Phosphatase 10/24/2021 773* 40 - 130 U/L Final    ALT 10/24/2021 6  0 - 33 U/L Final    AST 10/24/2021 11  0 - 35 U/L Final    Globulin 10/24/2021 3.9* 2.3 - 3.5 g/dL Final         ASSESSMENT:    The patient is a 27 y.o. female  37w5d with :     mutli-risk factors for pre-eclampsia   High risk pregnancy at term   Patient to stay for 24 hr urine and observation for possible delivery     There are no active hospital problems to display for this patient. Orders Placed This Encounter   Procedures    Urinalysis    PROTEIN TOTAL URINE RANDOM, ON    CBC Auto Differential    Comprehensive Metabolic Panel    ADULT DIET;  Regular; 3 carb choices (45 gm/meal)    Vital signs per unit routine    Continuous external fetal heart rate monitoring    External uterine contraction monitoring    Notify physician for    I/O per unit routine    Place intermittent pneumatic compression device when not ambulatory    HYPOGLYCEMIA TREATMENT: blood glucose less than 50 mg/dL and patient  ALERT and TOLERATING PO    HYPOGLYCEMIA TREATMENT: blood glucose less than 70 mg/dL and patient ALERT and TOLERATING PO    HYPOGLYCEMIA TREATMENT: blood glucose less than 70 mg/dL and patient NOT ALERT or NPO    Full Code    Nonrebreather mask oxygen    POCT glucose    POCT Glucose     Orders Placed This Encounter   Medications    nalbuphine (NUBAIN) injection 10 mg    ondansetron (ZOFRAN) injection 4 mg    ondansetron (ZOFRAN) 4 MG/2ML injection     Phyllis Tovar: cabinet override    nalbuphine (NUBAIN) 10 MG/ML injection     Phyllis Tovar: cabinet override    acetaminophen (TYLENOL) tablet 650 mg    OR Linked Order Group     ondansetron (ZOFRAN-ODT) disintegrating tablet 4 mg     ondansetron (ZOFRAN) injection 4 mg    insulin lispro (HUMALOG) injection vial 0-12 Units    insulin lispro (HUMALOG) injection vial 0-6 Units    metoclopramide (REGLAN) injection 10 mg    nalbuphine (NUBAIN) injection 10 mg    glucose (GLUTOSE) 40 % oral gel 15 g    dextrose 50 % IV solution    glucagon (rDNA) injection 1 mg    dextrose 5 % solution       PLAN:     To L&D   24 hr urine for TP  Diabetic diet   Insulin sliding scale       Ky Cosme MD  10/24/2021

## 2021-10-25 ENCOUNTER — ANESTHESIA EVENT (OUTPATIENT)
Dept: LABOR AND DELIVERY | Age: 31
End: 2021-10-25
Payer: COMMERCIAL

## 2021-10-25 VITALS — OXYGEN SATURATION: 99 % | SYSTOLIC BLOOD PRESSURE: 129 MMHG | DIASTOLIC BLOOD PRESSURE: 88 MMHG | TEMPERATURE: 98.6 F

## 2021-10-25 PROBLEM — Z78.9 ADMITTED TO LABOR AND DELIVERY: Status: ACTIVE | Noted: 2021-10-25

## 2021-10-25 LAB
24HR URINE VOLUME (ML): 2300 ML
CREATININE 24 HOUR URINE: 1.68 G/TV (ref 0.8–1.5)
CREATININE URINE: 72.9 MG/DL
GLUCOSE BLD-MCNC: 101 MG/DL (ref 60–115)
Lab: 24 HOURS
PERFORMED ON: NORMAL
PROTEIN 24 HOUR URINE: 460 MG/TV (ref 0–200)
RPR TITER: NORMAL
RPR: REACTIVE

## 2021-10-25 PROCEDURE — 6370000000 HC RX 637 (ALT 250 FOR IP): Performed by: OBSTETRICS & GYNECOLOGY

## 2021-10-25 PROCEDURE — 88307 TISSUE EXAM BY PATHOLOGIST: CPT

## 2021-10-25 PROCEDURE — 2500000003 HC RX 250 WO HCPCS: Performed by: OBSTETRICS & GYNECOLOGY

## 2021-10-25 PROCEDURE — 2709999900 HC NON-CHARGEABLE SUPPLY: Performed by: OBSTETRICS & GYNECOLOGY

## 2021-10-25 PROCEDURE — 59510 CESAREAN DELIVERY: CPT | Performed by: OBSTETRICS & GYNECOLOGY

## 2021-10-25 PROCEDURE — 59514 CESAREAN DELIVERY ONLY: CPT | Performed by: OBSTETRICS & GYNECOLOGY

## 2021-10-25 PROCEDURE — 2500000003 HC RX 250 WO HCPCS: Performed by: NURSE ANESTHETIST, CERTIFIED REGISTERED

## 2021-10-25 PROCEDURE — 86780 TREPONEMA PALLIDUM: CPT

## 2021-10-25 PROCEDURE — 2580000003 HC RX 258: Performed by: OBSTETRICS & GYNECOLOGY

## 2021-10-25 PROCEDURE — 3700000000 HC ANESTHESIA ATTENDED CARE: Performed by: OBSTETRICS & GYNECOLOGY

## 2021-10-25 PROCEDURE — 1220000000 HC SEMI PRIVATE OB R&B

## 2021-10-25 PROCEDURE — 6360000002 HC RX W HCPCS: Performed by: NURSE ANESTHETIST, CERTIFIED REGISTERED

## 2021-10-25 PROCEDURE — 84156 ASSAY OF PROTEIN URINE: CPT

## 2021-10-25 PROCEDURE — S9443 LACTATION CLASS: HCPCS

## 2021-10-25 PROCEDURE — 3609079900 HC CESAREAN SECTION: Performed by: OBSTETRICS & GYNECOLOGY

## 2021-10-25 PROCEDURE — 6360000002 HC RX W HCPCS: Performed by: OBSTETRICS & GYNECOLOGY

## 2021-10-25 PROCEDURE — 7100000001 HC PACU RECOVERY - ADDTL 15 MIN: Performed by: OBSTETRICS & GYNECOLOGY

## 2021-10-25 PROCEDURE — 3700000001 HC ADD 15 MINUTES (ANESTHESIA): Performed by: OBSTETRICS & GYNECOLOGY

## 2021-10-25 PROCEDURE — 7100000000 HC PACU RECOVERY - FIRST 15 MIN: Performed by: OBSTETRICS & GYNECOLOGY

## 2021-10-25 RX ORDER — ONDANSETRON 2 MG/ML
4 INJECTION INTRAMUSCULAR; INTRAVENOUS EVERY 6 HOURS PRN
Status: DISCONTINUED | OUTPATIENT
Start: 2021-10-25 | End: 2021-10-25

## 2021-10-25 RX ORDER — PRENATAL VIT/IRON FUM/FOLIC AC 27MG-0.8MG
1 TABLET ORAL DAILY
Status: DISCONTINUED | OUTPATIENT
Start: 2021-10-25 | End: 2021-10-27 | Stop reason: HOSPADM

## 2021-10-25 RX ORDER — QUETIAPINE FUMARATE 50 MG/1
50 TABLET, FILM COATED ORAL EVERY EVENING
Status: DISCONTINUED | OUTPATIENT
Start: 2021-10-25 | End: 2021-10-27 | Stop reason: HOSPADM

## 2021-10-25 RX ORDER — SODIUM CHLORIDE, SODIUM LACTATE, POTASSIUM CHLORIDE, CALCIUM CHLORIDE 600; 310; 30; 20 MG/100ML; MG/100ML; MG/100ML; MG/100ML
INJECTION, SOLUTION INTRAVENOUS CONTINUOUS
Status: DISCONTINUED | OUTPATIENT
Start: 2021-10-25 | End: 2021-10-27 | Stop reason: HOSPADM

## 2021-10-25 RX ORDER — DIPHENHYDRAMINE HYDROCHLORIDE 50 MG/ML
25 INJECTION INTRAMUSCULAR; INTRAVENOUS EVERY 6 HOURS PRN
Status: DISCONTINUED | OUTPATIENT
Start: 2021-10-25 | End: 2021-10-27 | Stop reason: HOSPADM

## 2021-10-25 RX ORDER — SCOLOPAMINE TRANSDERMAL SYSTEM 1 MG/1
1 PATCH, EXTENDED RELEASE TRANSDERMAL
Status: DISCONTINUED | OUTPATIENT
Start: 2021-10-25 | End: 2021-10-27 | Stop reason: HOSPADM

## 2021-10-25 RX ORDER — DOCUSATE SODIUM 100 MG/1
100 CAPSULE, LIQUID FILLED ORAL DAILY
Status: DISCONTINUED | OUTPATIENT
Start: 2021-10-25 | End: 2021-10-27 | Stop reason: HOSPADM

## 2021-10-25 RX ORDER — KETOROLAC TROMETHAMINE 30 MG/ML
INJECTION, SOLUTION INTRAMUSCULAR; INTRAVENOUS PRN
Status: DISCONTINUED | OUTPATIENT
Start: 2021-10-25 | End: 2021-10-25 | Stop reason: SDUPTHER

## 2021-10-25 RX ORDER — SODIUM CHLORIDE 0.9 % (FLUSH) 0.9 %
10 SYRINGE (ML) INJECTION EVERY 12 HOURS SCHEDULED
Status: DISCONTINUED | OUTPATIENT
Start: 2021-10-25 | End: 2021-10-27 | Stop reason: HOSPADM

## 2021-10-25 RX ORDER — NALBUPHINE HCL 10 MG/ML
5 AMPUL (ML) INJECTION EVERY 4 HOURS PRN
Status: DISCONTINUED | OUTPATIENT
Start: 2021-10-25 | End: 2021-10-25

## 2021-10-25 RX ORDER — BUPIVACAINE HYDROCHLORIDE 7.5 MG/ML
INJECTION, SOLUTION INTRASPINAL PRN
Status: DISCONTINUED | OUTPATIENT
Start: 2021-10-25 | End: 2021-10-25 | Stop reason: SDUPTHER

## 2021-10-25 RX ORDER — PROPRANOLOL HYDROCHLORIDE 10 MG/1
10 TABLET ORAL 3 TIMES DAILY PRN
Status: DISCONTINUED | OUTPATIENT
Start: 2021-10-25 | End: 2021-10-27 | Stop reason: HOSPADM

## 2021-10-25 RX ORDER — IBUPROFEN 800 MG/1
800 TABLET ORAL EVERY 8 HOURS PRN
Status: DISCONTINUED | OUTPATIENT
Start: 2021-10-26 | End: 2021-10-27 | Stop reason: HOSPADM

## 2021-10-25 RX ORDER — NALOXONE HYDROCHLORIDE 0.4 MG/ML
0.4 INJECTION, SOLUTION INTRAMUSCULAR; INTRAVENOUS; SUBCUTANEOUS PRN
Status: DISCONTINUED | OUTPATIENT
Start: 2021-10-25 | End: 2021-10-25

## 2021-10-25 RX ORDER — MODIFIED LANOLIN
OINTMENT (GRAM) TOPICAL
Status: DISCONTINUED | OUTPATIENT
Start: 2021-10-25 | End: 2021-10-27 | Stop reason: HOSPADM

## 2021-10-25 RX ORDER — SCOLOPAMINE TRANSDERMAL SYSTEM 1 MG/1
1 PATCH, EXTENDED RELEASE TRANSDERMAL
Status: DISCONTINUED | OUTPATIENT
Start: 2021-10-25 | End: 2021-10-25

## 2021-10-25 RX ORDER — SODIUM CHLORIDE 0.9 % (FLUSH) 0.9 %
10 SYRINGE (ML) INJECTION PRN
Status: DISCONTINUED | OUTPATIENT
Start: 2021-10-25 | End: 2021-10-27 | Stop reason: HOSPADM

## 2021-10-25 RX ORDER — SODIUM CHLORIDE, SODIUM LACTATE, POTASSIUM CHLORIDE, AND CALCIUM CHLORIDE .6; .31; .03; .02 G/100ML; G/100ML; G/100ML; G/100ML
1000 INJECTION, SOLUTION INTRAVENOUS ONCE
Status: COMPLETED | OUTPATIENT
Start: 2021-10-25 | End: 2021-10-25

## 2021-10-25 RX ORDER — NALBUPHINE HCL 10 MG/ML
5 AMPUL (ML) INJECTION
Status: DISCONTINUED | OUTPATIENT
Start: 2021-10-25 | End: 2021-10-27 | Stop reason: HOSPADM

## 2021-10-25 RX ORDER — SODIUM CHLORIDE 9 MG/ML
25 INJECTION, SOLUTION INTRAVENOUS PRN
Status: DISCONTINUED | OUTPATIENT
Start: 2021-10-25 | End: 2021-10-27 | Stop reason: HOSPADM

## 2021-10-25 RX ORDER — DULOXETIN HYDROCHLORIDE 20 MG/1
20 CAPSULE, DELAYED RELEASE ORAL 2 TIMES DAILY
Status: DISCONTINUED | OUTPATIENT
Start: 2021-10-25 | End: 2021-10-27 | Stop reason: HOSPADM

## 2021-10-25 RX ORDER — OXYCODONE HYDROCHLORIDE AND ACETAMINOPHEN 5; 325 MG/1; MG/1
1 TABLET ORAL EVERY 4 HOURS PRN
Status: DISCONTINUED | OUTPATIENT
Start: 2021-10-25 | End: 2021-10-27 | Stop reason: HOSPADM

## 2021-10-25 RX ORDER — BUPROPION HYDROCHLORIDE 150 MG/1
150 TABLET ORAL EVERY MORNING
Status: DISCONTINUED | OUTPATIENT
Start: 2021-10-25 | End: 2021-10-27 | Stop reason: HOSPADM

## 2021-10-25 RX ORDER — KETOROLAC TROMETHAMINE 30 MG/ML
30 INJECTION, SOLUTION INTRAMUSCULAR; INTRAVENOUS EVERY 6 HOURS
Status: DISPENSED | OUTPATIENT
Start: 2021-10-25 | End: 2021-10-26

## 2021-10-25 RX ORDER — MORPHINE SULFATE 1 MG/ML
INJECTION, SOLUTION EPIDURAL; INTRATHECAL; INTRAVENOUS PRN
Status: DISCONTINUED | OUTPATIENT
Start: 2021-10-25 | End: 2021-10-25 | Stop reason: SDUPTHER

## 2021-10-25 RX ORDER — ONDANSETRON 2 MG/ML
INJECTION INTRAMUSCULAR; INTRAVENOUS PRN
Status: DISCONTINUED | OUTPATIENT
Start: 2021-10-25 | End: 2021-10-25 | Stop reason: SDUPTHER

## 2021-10-25 RX ORDER — DEXAMETHASONE SODIUM PHOSPHATE 4 MG/ML
INJECTION, SOLUTION INTRA-ARTICULAR; INTRALESIONAL; INTRAMUSCULAR; INTRAVENOUS; SOFT TISSUE PRN
Status: DISCONTINUED | OUTPATIENT
Start: 2021-10-25 | End: 2021-10-25 | Stop reason: SDUPTHER

## 2021-10-25 RX ORDER — OXYCODONE HYDROCHLORIDE AND ACETAMINOPHEN 5; 325 MG/1; MG/1
2 TABLET ORAL EVERY 4 HOURS PRN
Status: DISCONTINUED | OUTPATIENT
Start: 2021-10-25 | End: 2021-10-27 | Stop reason: HOSPADM

## 2021-10-25 RX ORDER — ONDANSETRON 2 MG/ML
4 INJECTION INTRAMUSCULAR; INTRAVENOUS EVERY 6 HOURS PRN
Status: DISCONTINUED | OUTPATIENT
Start: 2021-10-25 | End: 2021-10-27 | Stop reason: HOSPADM

## 2021-10-25 RX ADMIN — BUTALBITA,ACETAMINOPHEN AND CAFFEINE 2 CAPSULE: 50; 300; 40 CAPSULE ORAL at 04:56

## 2021-10-25 RX ADMIN — KETOROLAC TROMETHAMINE 30 MG: 30 INJECTION, SOLUTION INTRAMUSCULAR; INTRAVENOUS at 08:31

## 2021-10-25 RX ADMIN — Medication: at 20:35

## 2021-10-25 RX ADMIN — ONDANSETRON 4 MG: 2 INJECTION INTRAMUSCULAR; INTRAVENOUS at 08:31

## 2021-10-25 RX ADMIN — OXYCODONE AND ACETAMINOPHEN 1 TABLET: 5; 325 TABLET ORAL at 20:35

## 2021-10-25 RX ADMIN — KETOROLAC TROMETHAMINE 30 MG: 30 INJECTION, SOLUTION INTRAMUSCULAR at 14:11

## 2021-10-25 RX ADMIN — QUETIAPINE FUMARATE 50 MG: 50 TABLET ORAL at 20:35

## 2021-10-25 RX ADMIN — SODIUM CHLORIDE, POTASSIUM CHLORIDE, SODIUM LACTATE AND CALCIUM CHLORIDE: 600; 310; 30; 20 INJECTION, SOLUTION INTRAVENOUS at 21:29

## 2021-10-25 RX ADMIN — KETOROLAC TROMETHAMINE 30 MG: 30 INJECTION, SOLUTION INTRAMUSCULAR at 20:28

## 2021-10-25 RX ADMIN — NALBUPHINE HYDROCHLORIDE 5 MG: 10 INJECTION, SOLUTION INTRAMUSCULAR; INTRAVENOUS; SUBCUTANEOUS at 12:16

## 2021-10-25 RX ADMIN — BUPIVACAINE HYDROCHLORIDE 1.6 ML: 7.5 INJECTION, SOLUTION INTRASPINAL at 08:09

## 2021-10-25 RX ADMIN — Medication: at 10:40

## 2021-10-25 RX ADMIN — DOCUSATE SODIUM 100 MG: 100 CAPSULE ORAL at 18:42

## 2021-10-25 RX ADMIN — ONDANSETRON 4 MG: 2 INJECTION INTRAMUSCULAR; INTRAVENOUS at 04:56

## 2021-10-25 RX ADMIN — SODIUM CHLORIDE, POTASSIUM CHLORIDE, SODIUM LACTATE AND CALCIUM CHLORIDE: 600; 310; 30; 20 INJECTION, SOLUTION INTRAVENOUS at 07:03

## 2021-10-25 RX ADMIN — FAMOTIDINE 20 MG: 10 INJECTION, SOLUTION INTRAVENOUS at 07:01

## 2021-10-25 RX ADMIN — SODIUM CHLORIDE, POTASSIUM CHLORIDE, SODIUM LACTATE AND CALCIUM CHLORIDE 1000 ML: 600; 310; 30; 20 INJECTION, SOLUTION INTRAVENOUS at 18:17

## 2021-10-25 RX ADMIN — FAMOTIDINE 20 MG: 10 INJECTION, SOLUTION INTRAVENOUS at 00:52

## 2021-10-25 RX ADMIN — MORPHINE SULFATE 0.2 MG: 1 INJECTION EPIDURAL; INTRATHECAL; INTRAVENOUS at 08:09

## 2021-10-25 RX ADMIN — NALBUPHINE HYDROCHLORIDE 5 MG: 10 INJECTION, SOLUTION INTRAMUSCULAR; INTRAVENOUS; SUBCUTANEOUS at 16:47

## 2021-10-25 RX ADMIN — DIPHENHYDRAMINE HYDROCHLORIDE 25 MG: 50 INJECTION, SOLUTION INTRAMUSCULAR; INTRAVENOUS at 10:40

## 2021-10-25 RX ADMIN — CEFAZOLIN 2 G: 10 INJECTION, POWDER, FOR SOLUTION INTRAVENOUS at 08:04

## 2021-10-25 RX ADMIN — PRENATAL VIT W/ FE FUMARATE-FA TAB 27-0.8 MG 1 TABLET: 27-0.8 TAB at 18:41

## 2021-10-25 RX ADMIN — DEXAMETHASONE SODIUM PHOSPHATE 4 MG: 4 INJECTION INTRA-ARTICULAR; INTRALESIONAL; INTRAMUSCULAR; INTRAVENOUS; SOFT TISSUE at 08:31

## 2021-10-25 RX ADMIN — Medication 167 ML: at 08:31

## 2021-10-25 ASSESSMENT — PULMONARY FUNCTION TESTS
PIF_VALUE: 0
PIF_VALUE: 1
PIF_VALUE: 0
PIF_VALUE: 1
PIF_VALUE: 0

## 2021-10-25 ASSESSMENT — PAIN SCALES - GENERAL
PAINLEVEL_OUTOF10: 3
PAINLEVEL_OUTOF10: 0
PAINLEVEL_OUTOF10: 5
PAINLEVEL_OUTOF10: 3
PAINLEVEL_OUTOF10: 0
PAINLEVEL_OUTOF10: 1

## 2021-10-25 NOTE — ANESTHESIA PROCEDURE NOTES
Spinal Block    Patient location during procedure: OB  Start time: 10/25/2021 8:02 AM  End time: 10/25/2021 8:04 AM  Reason for block: primary anesthetic  Staffing  Performed: resident/CRNA   Resident/CRNA: VIDYA Engel CRNA  Preanesthetic Checklist  Completed: patient identified, IV checked, site marked, risks and benefits discussed, surgical consent, monitors and equipment checked, pre-op evaluation, timeout performed, anesthesia consent given, oxygen available and patient being monitored  Spinal Block  Patient position: sitting  Prep: ChloraPrep, site prepped and draped and x3  Patient monitoring: cardiac monitor, continuous pulse ox, continuous capnometry and frequent blood pressure checks  Approach: midline  Location: L3/L4  Provider prep: mask and sterile gloves  Local infiltration: lidocaine  Dose: 0.2  Agent: bupivacaine  Adjuvant: duramorph  Dose: 1.6  Dose: 1.6  Needle  Needle type: Pencan   Needle gauge: 24 G  Needle length: 3.5 in  Kit: hudson gerardo  Lot number: [id number]  Expiration date: 1/31/2022  Assessment  Sensory level: T6  Swirl obtained: Yes  CSF: clear  Attempts: 1  Hemodynamics: stable

## 2021-10-25 NOTE — ANESTHESIA PRE PROCEDURE
Department of Anesthesiology  Preprocedure Note       Name:  Alicia Rico   Age:  27 y.o.  :  1990                                          MRN:  [id number]         Date:  10/25/2021      Surgeon: Radha Fan):  Rosi Diaz DO    Procedure: Procedure(s):   SECTION    Medications prior to admission:   Prior to Admission medications    Medication Sig Start Date End Date Taking? Authorizing Provider   butalbital-APAP-caffeine -40 MG CAPS per capsule Take 1 capsule by mouth every 4 hours as needed for Headaches 10/11/21  Yes Rosi Diaz DO   Heparin Sodium, Porcine, (HEPARIN, PORCINE,) 91193 UNIT/ML injection Inject 1 mL into the skin every 12 hours for 21 days 10/11/21 11/1/21 Yes Rosi Diaz DO   blood glucose monitor strips Test blood glucose 4 times daily. 10/6/21  Yes Rosi Diaz DO   insulin regular (HUMULIN R) 100 UNIT/ML injection Inject 21 Units into the skin every morning (before breakfast) AND 16 Units Daily with supper. Patient taking differently: Inject 31 Units into the skin every morning (before breakfast) AND 18 Units Daily with supper.  21  Yes Rosi Diaz DO   insulin NPH (HUMULIN N;NOVOLIN N) 100 UNIT/ML injection pen 16 units every am and 43 units every pm  Patient taking differently: 18 Units 18 units every am and 49 units every pm 21  Yes Rosi Diaz DO   ondansetron (ZOFRAN ODT) 4 MG disintegrating tablet Take 1 tablet by mouth every 8 hours as needed for Nausea or Vomiting 21  Yes Belkis Alicea MD   acetaminophen (AMINOFEN) 325 MG tablet Take 2 tablets by mouth every 6 hours as needed for Pain 21  Yes Jose Manuel Warren PA-C   ondansetron (ZOFRAN-ODT) 4 MG disintegrating tablet Take 1 tablet by mouth 3 times daily as needed for Nausea or Vomiting 3/24/21  Yes Rosi Diaz DO   famotidine (PEPCID) 20 MG tablet Take 1 tablet by mouth 2 times daily 10/12/20  Yes Lyudmila Andrews PA-C   glyBURIDE (DIABETA) 2.5 MG tablet Take 1 tablet by mouth 2 times daily (with meals) 10/6/21   William Strange, DO   Blood Pressure Monitoring (ADULT BLOOD PRESSURE CUFF LG) KIT 1 Container by Does not apply route 3 times daily 10/6/21   William Strange DO   blood glucose monitor kit and supplies Test blood glucose 4 times a day 10/6/21   William Strange, DO   Alcohol Swabs (ALCOHOL PREP) 70 % PADS Use to wipe finger before testing blood glucose 10/6/21   Dora Cruz DO   insulin regular human (HUMULIN R U-500 KWIKPEN) 500 UNIT/ML SOPN concentrated injection pen Inject 21 Units into the skin every morning (before breakfast) AND 16 Units Daily with supper.  9/7/21   William Strange DO   Lancets MISC Test blood glucose 4 times daily 8/2/21   Dora Hernandez DO   PROMETHEGAN 12.5 MG suppository PLACE 1 SUPPOSITORY RECTALLY EVERY 6 HOURS AS NEEDED FOR NAUSEA 5/18/21   Historical Provider, MD   DULoxetine (CYMBALTA) 20 MG extended release capsule Take 1 capsule by mouth 2 times daily 5/25/21   Nohemi Peabody, MD   QUEtiapine (SEROQUEL) 50 MG tablet Take 1 tablet by mouth every evening 5/25/21   Nohemi Peabody, MD   propranolol (INDERAL) 10 MG tablet Take 1 tablet by mouth 3 times daily as needed (anxiety) 5/25/21   Nohemi Peabody, MD   doxyLAMINE succinate (GNP SLEEP AID) 25 MG tablet Take 1 tablet by mouth nightly 5/25/21   Nohemi Peabody, MD   hydrOXYzine (ATARAX) 50 MG tablet Take 1 tablet by mouth every 6 hours as needed for Anxiety (sleep) 11/24/20   Nohemi Peabody, MD   buPROPion (WELLBUTRIN XL) 150 MG extended release tablet Take 1 tablet by mouth every morning 10/12/20   Nohemi Peabody, MD   DULoxetine (CYMBALTA) 20 MG extended release capsule Take 1 capsule by mouth 2 times daily 10/12/20   Nohemi Peabody, MD   hydrOXYzine (VISTARIL) 25 MG capsule take one capsule by mouth 4 times daily as needed for anxiety 8/24/20   Historical Provider, MD       Current medications:    Current Facility-Administered Medications   Medication Dose Route Frequency Provider Last Rate Last Admin    naloxone (NARCAN) injection 0.4 mg  0.4 mg IntraVENous LENCHO Cohen MD        scopolamine (TRANSDERM-SCOP) transdermal patch 1 patch  1 patch TransDERmal On Call to [id number] Morgan Ortiz MD   1 patch at 10/25/21 0702    scopolamine (TRANSDERM-SCOP) transdermal patch 1 patch  1 patch TransDERmal Q72H Amy Coppola DO        acetaminophen (TYLENOL) tablet 650 mg  650 mg Oral Q4H PRSAMANTA Cohen MD        ondansetron (ZOFRAN-ODT) disintegrating tablet 4 mg  4 mg Oral Q8H PRN Matilde Ch MD        Or    ondansetron (ZOFRAN) injection 4 mg  4 mg IntraVENous Q6H PRN Matilde Ch MD        insulin lispro (HUMALOG) injection vial 0-12 Units  0-12 Units SubCUTAneous 4x Daily AC & HS Matilde Ch MD        insulin lispro (HUMALOG) injection vial 0-6 Units  0-6 Units SubCUTAneous 4x Daily AC & HS Matilde Ch MD        metoclopramide (REGLAN) injection 10 mg  10 mg IntraVENous Q6H Matilde Ch MD        nalbuphine (NUBAIN) injection 10 mg  10 mg IntraVENous Q4H PRSAMANTA Cohen MD   10 mg at 10/24/21 2157    glucose (GLUTOSE) 40 % oral gel 15 g  15 g Oral PRSAMANTA Cohen MD        dextrose 50 % IV solution  12.5 g IntraVENous PRSAMANTA Cohen MD        glucagon (rDNA) injection 1 mg  1 mg IntraMUSCular PRSAMANTA Cohen MD        dextrose 5 % solution  100 mL/hr IntraVENous PRN Nancy Cohen MD        lactated ringers infusion   IntraVENous Continuous Amy Coppola  mL/hr at 10/25/21 0703 New Bag at 10/25/21 0703    lactated ringers bolus  1,000 mL IntraVENous Once Amy Coppola DO        sodium chloride flush 0.9 % injection 10 mL  10 mL IntraVENous PRN Dora Hernandez DO   10 mL at 10/24/21 1525    0.9 % sodium chloride infusion  25 mL IntraVENous PRN Amy Coppola DO        ceFAZolin (ANCEF) 2000 mg in dextrose 5 % 100 mL IVPB  2,000 mg IntraVENous Once Noreen Bravo DO   Held at 10/24/21 1504    ondansetron (ZOFRAN) injection 4 mg  4 mg IntraVENous Q6H PRN Noreen Bravo DO   4 mg at 10/25/21 0456    insulin NPH (HUMULIN N;NOVOLIN N) injection vial 18 Units  18 Units SubCUTAneous QAM  Dora Hernandez DO        insulin NPH (HUMULIN N;NOVOLIN N) injection vial 49 Units  49 Units SubCUTAneous Nightly Dora Hernandez DO        insulin regular (HUMULIN R;NOVOLIN R) injection 31 Units  31 Units SubCUTAneous QAM  Dora Hernandez DO        insulin regular (HUMULIN R;NOVOLIN R) injection 18 Units  18 Units SubCUTAneous Daily before dinner Noreen Bravo DO        butalbital-APAP-caffeine -40 MG per capsule 2 capsule  2 capsule Oral Q4H PRN Maribel Anglin MD   2 capsule at 10/25/21 0456       Allergies:     Allergies   Allergen Reactions    Amoxicillin Nausea And Vomiting and Rash    Erythromycin Rash    Tequin [Gatifloxacin] Nausea And Vomiting       Problem List:    Patient Active Problem List   Diagnosis Code    Allergy status to other drugs, medicaments and biological substances status Z88.8    Allergy status to penicillin Z88.0    Antiphospholipid syndrome ([facility]) D68.61    Anxiety F41.9    Hypothyroidism, unspecified E03.9    Mood disorder ([facility]) F39    Morbid obesity ([facility]) E66.01    Myelofibrosis ([facility]) D75.81    Other visual disturbances H53.8    Personal history of nicotine dependence Z87.891    Other adrenal hypofunction E27.49    Insomnia G47.00    Sleep apnea G47.30    Moderate episode of recurrent major depressive disorder ([facility]) F33.1    FELIZ (generalized anxiety disorder) F41.1    Nausea/vomiting in pregnancy O21.9    Gestational diabetes mellitus (GDM) in third trimester O24.419    Gestational diabetes mellitus (GDM), antepartum O23.80    Pre-eclampsia in third trimester O14.93    Admitted to labor and delivery Z78.9       Past Medical History:        Diagnosis Date    Adrenal insufficiency (Bao's disease) [facility]     Anti-phospholipid antibody syndrome ([facility]     Anxiety 2017    Depression 2017    Diet controlled gestational diabetes mellitus (GDM) in third trimester     Disease of blood and blood forming organ     History of blood transfusion     Pre-eclampsia in third trimester        Past Surgical History:        Procedure Laterality Date     SECTION      GALLBLADDER SURGERY Bilateral     OVARY REMOVAL Left        Social History:    Social History     Tobacco Use    Smoking status: Former Smoker     Types: Cigarettes     Quit date: 7/3/2015     Years since quittin.3    Smokeless tobacco: Never Used   Substance Use Topics    Alcohol use:  No                                Counseling given: Not Answered      Vital Signs (Current):   Vitals:    10/24/21 1030 10/24/21 1606 10/24/21 1928 10/25/21 0436   BP: 119/80 120/80 123/75 134/79   Pulse:  87 81 77   Resp:  18 16 16   Temp:  36.6 °C (97.9 °F) 37.3 °C (99.2 °F) 36.9 °C (98.4 °F)   TempSrc:  Oral Oral Oral   SpO2:  97%                                                BP Readings from Last 3 Encounters:   10/25/21 134/79   10/24/21 123/80   10/23/21 133/75       NPO Status:                                                                                 BMI:   Wt Readings from Last 3 Encounters:   10/24/21 272 lb (123.4 kg)   10/20/21 273 lb (123.8 kg)   10/13/21 270 lb (122.5 kg)     There is no height or weight on file to calculate BMI.    CBC:   Lab Results   Component Value Date    WBC 14.1 10/24/2021    RBC 4.62 10/24/2021    RBC 4.64 10/14/2018    HGB 12.0 10/24/2021    HCT 36.3 10/24/2021    MCV 78.5 10/24/2021    RDW 14.1 10/24/2021     10/24/2021       CMP:   Lab Results   Component Value Date     10/24/2021    K 4.3 10/24/2021    CL 99 10/24/2021    CO2 17 10/24/2021    BUN 12 10/24/2021    CREATININE 0.64 10/24/2021    GFRAA >60.0 10/24/2021    AGRATIO 1.1 10/14/2018    LABGLOM >60.0 10/24/2021 GLUCOSE 88 10/24/2021    GLUCOSE 92 10/14/2018    PROT 7.4 10/24/2021    CALCIUM 9.4 10/24/2021    BILITOT <0.2 10/24/2021    ALKPHOS 773 10/24/2021    AST 11 10/24/2021    ALT 6 10/24/2021       POC Tests:   Recent Labs     10/24/21  1926   POCGLU 86       Coags:   Lab Results   Component Value Date    PROTIME 13.0 10/24/2021    INR 1.0 10/24/2021    APTT 33.5 10/24/2021       HCG (If Applicable):   Lab Results   Component Value Date    PREGTESTUR positive 04/07/2021    PREGSERUM Negative 10/13/2018        ABGs: No results found for: PHART, PO2ART, FIT6HSG, KJY7RBN, BEART, G8YNDCTK     Type & Screen (If Applicable):  No results found for: LABABO, LABRH    Drug/Infectious Status (If Applicable):  No results found for: HIV, HEPCAB    COVID-19 Screening (If Applicable):   Lab Results   Component Value Date    COVID19 Not Detected 10/24/2021           Anesthesia Evaluation    Airway: Mallampati: III        Dental: normal exam         Pulmonary:   (+) sleep apnea:  decreased breath sounds,                             Cardiovascular:    (+) hypertension: no interval change,                   Neuro/Psych:   (+) psychiatric history: stable with treatment            GI/Hepatic/Renal:   (+) morbid obesity          Endo/Other:    (+) Diabetesno interval change, , hypothyroidism::., .                 Abdominal:   (+) obese,     Abdomen: tender. Vascular: Other Findings:             Anesthesia Plan      spinal     ASA 3           MIPS: Postoperative opioids intended and Prophylactic antiemetics administered. Anesthetic plan and risks discussed with patient. Plan discussed with attending.                   Sayda Delgadillo, VIDYA - CRNA   10/25/2021

## 2021-10-25 NOTE — OP NOTE
Indications: previous uterine incision low transverse                       Intractable headache with mild proteinuria    Pre-operative Diagnosis: IUP @ 37w6d                                               Intractable HA with mild proteinuria                                                GDM controlled with insulin                                               H/O severe preeclampsia  . Post-operative Diagnosis: Living  infant(s) and Female    Surgeon: Frank Little DO     Assistants: Harper Johnston MD    Anesthesia: Spinal anesthesia    ASA Class: 2    Procedure Details  The risks, benefits, complications, treatment options, and expected outcomes were discussed with the patient. The patient concurred with the proposed plan, giving informed consent. The site of surgery properly noted/marked. The patient was taken to Operating Room  identified as Talia Robbins and the procedure verified as  Delivery. A Time Out was held and the above information confirmed. The use of a first assistant surgeon was necessary because there was no qualified resident surgeon available. The assistant surgeon assisted in the proper positioning, prepping, and draping of the patient, intraoperative retraction and suctioning for visualization, passing sutures and suture management. After spinal induction of anesthesia, the patient was draped and prepped in the usual sterile manner. A Pfannenstiel incision was made and carried down through the subcutaneous tissue to the fascia. Fascial incision was made and extended transversely. The fascia was  from the underlying rectus tissue superiorly and inferiorly. The peritoneum was identified and entered. Peritoneal incision was extended longitudinally. The utero-vesical peritoneal reflection was incised transversely and the bladder flap was bluntly freed from the lower uterine segment. Donzell Peak was placed for retraction.   A low transverse uterine incision was made. Delivered from vertex presentation was a 3010 gram female with Apgar scores of 9 at one minute and 9 at five minutes. After the umbilical cord was clamped and cut cord blood was obtained for evaluation. Three vessel cord noted. The placenta was removed intact and appeared normal. The uterine outline, tubes and ovaries appeared normal. The uterine incision was closed with running  sutures of 0 Vicryl. Hemostasis was observed. Peritoneal was closed with 2-0 vicryl running. The fascial layers were then reapproximated with running sutures of  Vicryl . The skin was closed with 4-0 vicryl in subcuticular fashion. Proveena placed    Instrument, sponge, and needle counts were correct prior the abdominal closure and at the conclusion of the case. Findings:  Normal uterus no adhesions. Atrophic or remenant left ovary normal right    Intake/Output:     Date 10/24/21 0701 - 10/25/21 0700 10/25/21 0701 - 10/26/21 0700   Shift 4189-8712 4095-9681 1460-8043 24 Hour Total 7711-9827 8470-4073 4303-1672 24 Hour Total   INTAKE   I.V.  10  10       Shift Total  10  10       OUTPUT   Blood     275   275     Quantitative Blood Loss (mL)     275   275   Shift Total     275   275   NET  10  10 -275   -275            Drains: 200cc urine                Specimens: placenta                    Complications:  none         Disposition: PACU - hemodynamically stable. Condition: infant stable to general nursery and mother stable    Attending Attestation: I performed the procedure.

## 2021-10-25 NOTE — ANESTHESIA POSTPROCEDURE EVALUATION
Department of Anesthesiology  Postprocedure Note    Patient: Cortney Bustamante  MRN: [id number]  YOB: 1990  Date of evaluation: 10/25/2021  Time:  9:21 AM     Procedure Summary     Date: 10/25/21 Room / Location: [facility]    Anesthesia Start: 4529 Anesthesia Stop:     Procedure:  SECTION (N/A ) Diagnosis:     Surgeons: Samuel Schumacher DO Responsible Provider: VIDYA Jhaveri CRNA    Anesthesia Type: spinal ASA Status: 3          Anesthesia Type: No value filed. Ronald Phase I:      Ronald Phase II:      Last vitals: Reviewed and per EMR flowsheets.        Anesthesia Post Evaluation    Patient location during evaluation: bedside  Patient participation: complete - patient participated  Level of consciousness: awake and alert  Pain score: 0  Airway patency: patent  Nausea & Vomiting: no nausea  Complications: no  Cardiovascular status: hemodynamically stable  Respiratory status: acceptable  Hydration status: stable  Multimodal analgesia pain management approach

## 2021-10-25 NOTE — FLOWSHEET NOTE
Urine output 150 mL since 1030 this morning. Consulted with Dr. Susa Kawasaki. Doctor ordered 1000 mL LR bolus over 1 hour.

## 2021-10-25 NOTE — FLOWSHEET NOTE
1 [facility] notified of heart rate. Informed that scope patch was removed. Orders rec'd.

## 2021-10-25 NOTE — PROGRESS NOTES
With my assistance __BIN___ performed a , I assisted with opening the abdomen, incising the uterus and delivering a living male/female baby.       Then the uterus and the abdomen were closed

## 2021-10-25 NOTE — FLOWSHEET NOTE
Pt transferred to room #327 via bed. Pt tolerated transfer well. Oriented to room. Pt also stated that her itching is \"better\" since administration of Nubain.

## 2021-10-26 LAB
ALBUMIN SERPL-MCNC: 2.6 G/DL (ref 3.5–4.6)
ALP BLD-CCNC: 488 U/L (ref 40–130)
ALT SERPL-CCNC: 6 U/L (ref 0–33)
ANION GAP SERPL CALCULATED.3IONS-SCNC: 12 MEQ/L (ref 9–15)
AST SERPL-CCNC: 10 U/L (ref 0–35)
BASOPHILS ABSOLUTE: 0 K/UL (ref 0–0.2)
BASOPHILS RELATIVE PERCENT: 0.3 %
BILIRUB SERPL-MCNC: <0.2 MG/DL (ref 0.2–0.7)
BUN BLDV-MCNC: 17 MG/DL (ref 6–20)
CALCIUM SERPL-MCNC: 8.8 MG/DL (ref 8.5–9.9)
CHLORIDE BLD-SCNC: 101 MEQ/L (ref 95–107)
CO2: 19 MEQ/L (ref 20–31)
CREAT SERPL-MCNC: 0.88 MG/DL (ref 0.5–0.9)
EOSINOPHILS ABSOLUTE: 0.1 K/UL (ref 0–0.7)
EOSINOPHILS RELATIVE PERCENT: 0.4 %
GFR AFRICAN AMERICAN: >60
GFR NON-AFRICAN AMERICAN: >60
GLOBULIN: 3.1 G/DL (ref 2.3–3.5)
GLUCOSE BLD-MCNC: 101 MG/DL (ref 60–115)
GLUCOSE BLD-MCNC: 87 MG/DL (ref 70–99)
GLUCOSE BLD-MCNC: 98 MG/DL (ref 60–115)
HCT VFR BLD CALC: 27.1 % (ref 37–47)
HEMOGLOBIN: 9 G/DL (ref 12–16)
LYMPHOCYTES ABSOLUTE: 2.7 K/UL (ref 1–4.8)
LYMPHOCYTES RELATIVE PERCENT: 18.6 %
MCH RBC QN AUTO: 26.1 PG (ref 27–31.3)
MCHC RBC AUTO-ENTMCNC: 33.1 % (ref 33–37)
MCV RBC AUTO: 79.1 FL (ref 82–100)
MONOCYTES ABSOLUTE: 1.1 K/UL (ref 0.2–0.8)
MONOCYTES RELATIVE PERCENT: 7.8 %
NEUTROPHILS ABSOLUTE: 10.5 K/UL (ref 1.4–6.5)
NEUTROPHILS RELATIVE PERCENT: 72.9 %
PDW BLD-RTO: 13.5 % (ref 11.5–14.5)
PERFORMED ON: NORMAL
PERFORMED ON: NORMAL
PLATELET # BLD: 205 K/UL (ref 130–400)
POTASSIUM SERPL-SCNC: 4.1 MEQ/L (ref 3.4–4.9)
RBC # BLD: 3.43 M/UL (ref 4.2–5.4)
SODIUM BLD-SCNC: 132 MEQ/L (ref 135–144)
TOTAL PROTEIN: 5.7 G/DL (ref 6.3–8)
WBC # BLD: 14.4 K/UL (ref 4.8–10.8)

## 2021-10-26 PROCEDURE — 6370000000 HC RX 637 (ALT 250 FOR IP): Performed by: OBSTETRICS & GYNECOLOGY

## 2021-10-26 PROCEDURE — 85025 COMPLETE CBC W/AUTO DIFF WBC: CPT

## 2021-10-26 PROCEDURE — 1220000000 HC SEMI PRIVATE OB R&B

## 2021-10-26 PROCEDURE — 80053 COMPREHEN METABOLIC PANEL: CPT

## 2021-10-26 PROCEDURE — 36415 COLL VENOUS BLD VENIPUNCTURE: CPT

## 2021-10-26 PROCEDURE — 6360000002 HC RX W HCPCS: Performed by: OBSTETRICS & GYNECOLOGY

## 2021-10-26 RX ORDER — CALCIUM CARBONATE 200(500)MG
500 TABLET,CHEWABLE ORAL 3 TIMES DAILY PRN
Status: DISCONTINUED | OUTPATIENT
Start: 2021-10-26 | End: 2021-10-27 | Stop reason: HOSPADM

## 2021-10-26 RX ORDER — OXYCODONE HYDROCHLORIDE AND ACETAMINOPHEN 5; 325 MG/1; MG/1
1 TABLET ORAL EVERY 6 HOURS PRN
Qty: 20 TABLET | Refills: 0 | Status: SHIPPED | OUTPATIENT
Start: 2021-10-26 | End: 2021-10-31

## 2021-10-26 RX ORDER — IBUPROFEN 800 MG/1
800 TABLET ORAL EVERY 8 HOURS PRN
Qty: 90 TABLET | Refills: 1 | Status: SHIPPED | OUTPATIENT
Start: 2021-10-26

## 2021-10-26 RX ADMIN — DULOXETINE HYDROCHLORIDE 20 MG: 20 CAPSULE, DELAYED RELEASE ORAL at 19:52

## 2021-10-26 RX ADMIN — ENOXAPARIN SODIUM 80 MG: 80 INJECTION SUBCUTANEOUS at 09:15

## 2021-10-26 RX ADMIN — OXYCODONE AND ACETAMINOPHEN 1 TABLET: 5; 325 TABLET ORAL at 06:34

## 2021-10-26 RX ADMIN — OXYCODONE AND ACETAMINOPHEN 2 TABLET: 5; 325 TABLET ORAL at 18:01

## 2021-10-26 RX ADMIN — DULOXETINE HYDROCHLORIDE 20 MG: 20 CAPSULE, DELAYED RELEASE ORAL at 09:15

## 2021-10-26 RX ADMIN — OXYCODONE AND ACETAMINOPHEN 2 TABLET: 5; 325 TABLET ORAL at 11:51

## 2021-10-26 RX ADMIN — ANTACID TABLETS 500 MG: 500 TABLET, CHEWABLE ORAL at 20:17

## 2021-10-26 RX ADMIN — ENOXAPARIN SODIUM 80 MG: 80 INJECTION SUBCUTANEOUS at 19:52

## 2021-10-26 RX ADMIN — PRENATAL VIT W/ FE FUMARATE-FA TAB 27-0.8 MG 1 TABLET: 27-0.8 TAB at 09:15

## 2021-10-26 RX ADMIN — OXYCODONE AND ACETAMINOPHEN 1 TABLET: 5; 325 TABLET ORAL at 01:59

## 2021-10-26 RX ADMIN — IBUPROFEN 800 MG: 800 TABLET, FILM COATED ORAL at 19:51

## 2021-10-26 RX ADMIN — KETOROLAC TROMETHAMINE 30 MG: 30 INJECTION, SOLUTION INTRAMUSCULAR at 01:59

## 2021-10-26 RX ADMIN — BUPROPION HYDROCHLORIDE 150 MG: 150 TABLET, EXTENDED RELEASE ORAL at 09:15

## 2021-10-26 RX ADMIN — IBUPROFEN 800 MG: 800 TABLET, FILM COATED ORAL at 11:51

## 2021-10-26 RX ADMIN — QUETIAPINE FUMARATE 50 MG: 50 TABLET ORAL at 19:52

## 2021-10-26 RX ADMIN — OXYCODONE AND ACETAMINOPHEN 2 TABLET: 5; 325 TABLET ORAL at 22:00

## 2021-10-26 RX ADMIN — DOCUSATE SODIUM 100 MG: 100 CAPSULE ORAL at 09:15

## 2021-10-26 ASSESSMENT — PAIN DESCRIPTION - DESCRIPTORS
DESCRIPTORS: ACHING
DESCRIPTORS: ACHING

## 2021-10-26 ASSESSMENT — PAIN DESCRIPTION - LOCATION
LOCATION: ABDOMEN
LOCATION: ABDOMEN

## 2021-10-26 ASSESSMENT — PAIN SCALES - GENERAL
PAINLEVEL_OUTOF10: 8
PAINLEVEL_OUTOF10: 9
PAINLEVEL_OUTOF10: 3
PAINLEVEL_OUTOF10: 9
PAINLEVEL_OUTOF10: 7
PAINLEVEL_OUTOF10: 8
PAINLEVEL_OUTOF10: 6

## 2021-10-26 NOTE — LACTATION NOTE
In to visit pt   Mother states she breast fed at 0830 and the infant nursed for 30 minutes  Encouraged mother to breast feed every 2-3 hours for 10-20 minutes per breast  Reviewed the intake and output of the   Informed mother about [email or web address]  Encouraged to enrike with the next breast feed     1246   In to visit pt  Pt wants to sleep    1440  Infant undressed   Infant to right breast with cradle hold  Infant latched and sucking   Mother states infant breast feeds usually on one breast per session  Encouraged mother to  Breast feed on both breast when breast milk comes in

## 2021-10-26 NOTE — PROGRESS NOTES
DISCHARGE SUMMARY:  POSTOP DAY 1    Pt doing well. Pt ambulating, tolerating po and voiding without issue. Pt bottle/breast feeding without issue. /62   Pulse 83   Temp 98 °F (36.7 °C) (Oral)   Resp 18   LMP 2021 (Exact Date)   SpO2 100%   Breastfeeding Unknown   Hemoglobin   Date Value Ref Range Status   10/26/2021 9.0 (L) 12.0 - 16.0 g/dL Final     CVS: RRR  Lungs: CTAB  ABD: soft, NT, uterus firm at umbilicus  Pfannenstiel incision covered with proveena  EXT: no c/c/e    27 y.o. [id number] now P s/p  delivery doing well POD#1. 1. Routine postop care   2. May discharge to home when infant released  3. Rx percocet and Ibuprofen  4.  F/u with Dr Concepción Lakhani in 1 week    [address] [facility]

## 2021-10-26 NOTE — PLAN OF CARE
Problem: Skin Integrity:  Goal: Will show no infection signs and symptoms  Description: Will show no infection signs and symptoms  Outcome: Ongoing  Goal: Absence of new skin breakdown  Description: Absence of new skin breakdown  Outcome: Ongoing     Problem: Discharge Planning:  Goal: Discharged to appropriate level of care  Description: Discharged to appropriate level of care  Outcome: Ongoing     Problem: Fluid Volume - Imbalance:  Goal: Absence of postpartum hemorrhage signs and symptoms  Description: Absence of postpartum hemorrhage signs and symptoms  Outcome: Ongoing  Goal: Absence of imbalanced fluid volume signs and symptoms  Description: Absence of imbalanced fluid volume signs and symptoms  Outcome: Ongoing     Problem: Infection - Surgical Site:  Goal: Will show no infection signs and symptoms  Description: Will show no infection signs and symptoms  Outcome: Ongoing     Problem: Mood - Altered:  Goal: Mood stable  Description: Mood stable  Outcome: Ongoing     Problem: Nausea/Vomiting:  Goal: Absence of nausea/vomiting  Description: Absence of nausea/vomiting  Outcome: Ongoing     Problem: Pain - Acute:  Goal: Pain level will decrease  Description: Pain level will decrease  Outcome: Ongoing     Problem: Urinary Retention:  Goal: Urinary elimination within specified parameters  Description: Urinary elimination within specified parameters  Outcome: Ongoing     Problem: Venous Thromboembolism:  Goal: Will show no signs or symptoms of venous thromboembolism  Description: Will show no signs or symptoms of venous thromboembolism  Outcome: Ongoing  Goal: Absence of signs or symptoms of impaired coagulation  Description: Absence of signs or symptoms of impaired coagulation  Outcome: Ongoing

## 2021-10-27 VITALS
HEART RATE: 81 BPM | OXYGEN SATURATION: 97 % | RESPIRATION RATE: 18 BRPM | TEMPERATURE: 98.4 F | SYSTOLIC BLOOD PRESSURE: 129 MMHG | DIASTOLIC BLOOD PRESSURE: 75 MMHG

## 2021-10-27 LAB — TREPONEMA PALLIDUM ANTIBODIES: NON REACTIVE

## 2021-10-27 PROCEDURE — 6370000000 HC RX 637 (ALT 250 FOR IP): Performed by: OBSTETRICS & GYNECOLOGY

## 2021-10-27 PROCEDURE — 6360000002 HC RX W HCPCS: Performed by: OBSTETRICS & GYNECOLOGY

## 2021-10-27 RX ADMIN — BUPROPION HYDROCHLORIDE 150 MG: 150 TABLET, EXTENDED RELEASE ORAL at 10:23

## 2021-10-27 RX ADMIN — OXYCODONE AND ACETAMINOPHEN 1 TABLET: 5; 325 TABLET ORAL at 02:49

## 2021-10-27 RX ADMIN — DOCUSATE SODIUM 100 MG: 100 CAPSULE ORAL at 10:23

## 2021-10-27 RX ADMIN — IBUPROFEN 800 MG: 800 TABLET, FILM COATED ORAL at 16:19

## 2021-10-27 RX ADMIN — IBUPROFEN 800 MG: 800 TABLET, FILM COATED ORAL at 05:32

## 2021-10-27 RX ADMIN — OXYCODONE AND ACETAMINOPHEN 2 TABLET: 5; 325 TABLET ORAL at 10:23

## 2021-10-27 RX ADMIN — ENOXAPARIN SODIUM 80 MG: 80 INJECTION SUBCUTANEOUS at 10:24

## 2021-10-27 RX ADMIN — PRENATAL VIT W/ FE FUMARATE-FA TAB 27-0.8 MG 1 TABLET: 27-0.8 TAB at 10:23

## 2021-10-27 RX ADMIN — DULOXETINE HYDROCHLORIDE 20 MG: 20 CAPSULE, DELAYED RELEASE ORAL at 10:23

## 2021-10-27 ASSESSMENT — PAIN SCALES - GENERAL
PAINLEVEL_OUTOF10: 9
PAINLEVEL_OUTOF10: 7
PAINLEVEL_OUTOF10: 8
PAINLEVEL_OUTOF10: 8

## 2021-10-27 NOTE — DISCHARGE SUMMARY
PROGRESS NOTE POSTOP DAY 2: DISCHARGE SUMMARY    Pt doing well. Pt ambulating, tolerating po and voiding without issue. Pt bottle/breast feeding without issue. Pt eager for discharge. /64   Pulse 81   Temp 99.3 °F (37.4 °C) (Oral)   Resp 16   LMP 2021 (Exact Date)   SpO2 97%   Breastfeeding Unknown   Hemoglobin   Date Value Ref Range Status   10/26/2021 9.0 (L) 12.0 - 16.0 g/dL Final     CVS: RRR  Lungs: CTAB  ABD: soft, NT, uterus firm at umbilicus  Pfannenstiel incision without erythema  EXT: no c/c/e    27 y.o. [id number] now P s/p  delivery doing well POD#2.   1. Discharge pt to home when infant released  2. Rx Percocet, Ibuprofen  3.  Pt to f/u with Dr. Damaris Sandoval  in 1wks  Incision check    Andreas Valente, DO

## 2021-10-27 NOTE — PLAN OF CARE
Problem: Skin Integrity:  Goal: Will show no infection signs and symptoms  10/27/2021 1932 by David Majano RN  Outcome: Completed  10/27/2021 1511 by David Majano RN  Outcome: Ongoing  Goal: Absence of new skin breakdown  10/27/2021 1932 by David Majano RN  Outcome: Completed  10/27/2021 1511 by David Majano RN  Outcome: Ongoing     Problem: Discharge Planning:  Goal: Discharged to appropriate level of care  10/27/2021 1932 by David Majano RN  Outcome: Completed  10/27/2021 1511 by David Majano RN  Outcome: Ongoing     Problem: Fluid Volume - Imbalance:  Goal: Absence of postpartum hemorrhage signs and symptoms  Outcome: Completed  Goal: Absence of imbalanced fluid volume signs and symptoms  10/27/2021 1932 by David Majano RN  Outcome: Completed  10/27/2021 1511 by David Majano RN  Outcome: Ongoing     Problem: Infection - Surgical Site:  Goal: Will show no infection signs and symptoms  10/27/2021 1932 by David Majano RN  Outcome: Completed  10/27/2021 1511 by David Majano RN  Outcome: Ongoing     Problem: Mood - Altered:  Goal: Mood stable  10/27/2021 1932 by David Majano RN  Outcome: Completed  10/27/2021 1511 by David Majano RN  Outcome: Ongoing     Problem: Nausea/Vomiting:  Goal: Absence of nausea/vomiting  Outcome: Completed     Problem: Pain - Acute:  Goal: Pain level will decrease  10/27/2021 1932 by David Majano RN  Outcome: Completed  10/27/2021 1511 by David Majano RN  Outcome: Ongoing     Problem: Urinary Retention:  Goal: Urinary elimination within specified parameters  Outcome: Completed     Problem: Venous Thromboembolism:  Goal: Will show no signs or symptoms of venous thromboembolism  10/27/2021 1932 by David Majano RN  Outcome: Completed  10/27/2021 1511 by David Majano RN  Outcome: Ongoing  Goal: Absence of signs or symptoms of impaired coagulation  10/27/2021 1932 by David Majano RN  Outcome: Completed  10/27/2021 1511 by Darby Gonzalez RN  Outcome: Ongoing

## 2021-10-27 NOTE — PLAN OF CARE
Problem: Skin Integrity:  Goal: Will show no infection signs and symptoms  Outcome: Ongoing  Goal: Absence of new skin breakdown  Outcome: Ongoing     Problem: Discharge Planning:  Goal: Discharged to appropriate level of care  Outcome: Ongoing     Problem: Fluid Volume - Imbalance:  Goal: Absence of postpartum hemorrhage signs and symptoms  Outcome: Completed  Goal: Absence of imbalanced fluid volume signs and symptoms  Outcome: Ongoing     Problem: Infection - Surgical Site:  Goal: Will show no infection signs and symptoms  Outcome: Ongoing     Problem: Mood - Altered:  Goal: Mood stable  Outcome: Ongoing     Problem: Nausea/Vomiting:  Goal: Absence of nausea/vomiting  Outcome: Completed     Problem: Pain - Acute:  Goal: Pain level will decrease  Outcome: Ongoing     Problem: Urinary Retention:  Goal: Urinary elimination within specified parameters  Outcome: Completed     Problem: Venous Thromboembolism:  Goal: Will show no signs or symptoms of venous thromboembolism  Outcome: Ongoing  Goal: Absence of signs or symptoms of impaired coagulation  Outcome: Ongoing

## 2021-10-27 NOTE — FLOWSHEET NOTE
Guide for new mothers education, Discharge instructions, and  Post-Warning Signs sheet reviewed with patient. Questions answered. Pt verbalizes understanding. Pt on current meds for depression and anxiety, discussed follow-up as needed.

## 2021-10-28 ENCOUNTER — HOSPITAL ENCOUNTER (EMERGENCY)
Age: 31
Discharge: HOME | End: 2021-10-28
Payer: COMMERCIAL

## 2021-10-28 VITALS
HEIGHT: 64 IN | OXYGEN SATURATION: 98 % | HEART RATE: 80 BPM | DIASTOLIC BLOOD PRESSURE: 83 MMHG | RESPIRATION RATE: 20 BRPM | TEMPERATURE: 98 F | WEIGHT: 271 LBS | SYSTOLIC BLOOD PRESSURE: 116 MMHG | BODY MASS INDEX: 46.26 KG/M2

## 2021-10-28 DIAGNOSIS — T14.8XXA SKIN WOUND FROM SURGICAL INCISION: Primary | ICD-10-CM

## 2021-10-28 LAB
ALBUMIN SERPL-MCNC: 3.6 G/DL (ref 3.5–4.6)
ALP BLD-CCNC: 389 U/L (ref 40–130)
ALT SERPL-CCNC: 13 U/L (ref 0–33)
ANION GAP SERPL CALCULATED.3IONS-SCNC: 17 MEQ/L (ref 9–15)
AST SERPL-CCNC: 15 U/L (ref 0–35)
BASOPHILS ABSOLUTE: 0.1 K/UL (ref 0–0.2)
BASOPHILS RELATIVE PERCENT: 0.5 %
BILIRUB SERPL-MCNC: 0.3 MG/DL (ref 0.2–0.7)
BUN BLDV-MCNC: 13 MG/DL (ref 6–20)
CALCIUM SERPL-MCNC: 9.6 MG/DL (ref 8.5–9.9)
CHLORIDE BLD-SCNC: 95 MEQ/L (ref 95–107)
CO2: 20 MEQ/L (ref 20–31)
CREAT SERPL-MCNC: 0.66 MG/DL (ref 0.5–0.9)
EOSINOPHILS ABSOLUTE: 0.4 K/UL (ref 0–0.7)
EOSINOPHILS RELATIVE PERCENT: 2.1 %
GFR AFRICAN AMERICAN: >60
GFR NON-AFRICAN AMERICAN: >60
GLOBULIN: 4.1 G/DL (ref 2.3–3.5)
GLUCOSE BLD-MCNC: 83 MG/DL (ref 70–99)
HCT VFR BLD CALC: 37.1 % (ref 37–47)
HEMOGLOBIN: 12 G/DL (ref 12–16)
LYMPHOCYTES ABSOLUTE: 2.3 K/UL (ref 1–4.8)
LYMPHOCYTES RELATIVE PERCENT: 14 %
MCH RBC QN AUTO: 25 PG (ref 27–31.3)
MCHC RBC AUTO-ENTMCNC: 32.2 % (ref 33–37)
MCV RBC AUTO: 77.6 FL (ref 82–100)
MONOCYTES ABSOLUTE: 0.7 K/UL (ref 0.2–0.8)
MONOCYTES RELATIVE PERCENT: 4.3 %
NEUTROPHILS ABSOLUTE: 13.1 K/UL (ref 1.4–6.5)
NEUTROPHILS RELATIVE PERCENT: 79.1 %
PDW BLD-RTO: 13.7 % (ref 11.5–14.5)
PLATELET # BLD: 356 K/UL (ref 130–400)
POTASSIUM SERPL-SCNC: 4.2 MEQ/L (ref 3.4–4.9)
RBC # BLD: 4.78 M/UL (ref 4.2–5.4)
SODIUM BLD-SCNC: 132 MEQ/L (ref 135–144)
TOTAL PROTEIN: 7.7 G/DL (ref 6.3–8)
WBC # BLD: 16.6 K/UL (ref 4.8–10.8)

## 2021-10-28 PROCEDURE — 2580000003 HC RX 258: Performed by: STUDENT IN AN ORGANIZED HEALTH CARE EDUCATION/TRAINING PROGRAM

## 2021-10-28 PROCEDURE — 6360000002 HC RX W HCPCS: Performed by: STUDENT IN AN ORGANIZED HEALTH CARE EDUCATION/TRAINING PROGRAM

## 2021-10-28 PROCEDURE — 85025 COMPLETE CBC W/AUTO DIFF WBC: CPT

## 2021-10-28 PROCEDURE — 80053 COMPREHEN METABOLIC PANEL: CPT

## 2021-10-28 PROCEDURE — 36415 COLL VENOUS BLD VENIPUNCTURE: CPT

## 2021-10-28 PROCEDURE — 96375 TX/PRO/DX INJ NEW DRUG ADDON: CPT

## 2021-10-28 PROCEDURE — 96374 THER/PROPH/DIAG INJ IV PUSH: CPT

## 2021-10-28 PROCEDURE — 99284 EMERGENCY DEPT VISIT MOD MDM: CPT

## 2021-10-28 RX ORDER — DIPHENHYDRAMINE HYDROCHLORIDE 50 MG/ML
25 INJECTION INTRAMUSCULAR; INTRAVENOUS ONCE
Status: COMPLETED | OUTPATIENT
Start: 2021-10-28 | End: 2021-10-28

## 2021-10-28 RX ORDER — ONDANSETRON 2 MG/ML
4 INJECTION INTRAMUSCULAR; INTRAVENOUS ONCE
Status: COMPLETED | OUTPATIENT
Start: 2021-10-28 | End: 2021-10-28

## 2021-10-28 RX ORDER — ACETAMINOPHEN 500 MG
1000 TABLET ORAL 3 TIMES DAILY PRN
Qty: 30 TABLET | Refills: 0 | Status: SHIPPED | OUTPATIENT
Start: 2021-10-28

## 2021-10-28 RX ORDER — 0.9 % SODIUM CHLORIDE 0.9 %
1000 INTRAVENOUS SOLUTION INTRAVENOUS ONCE
Status: COMPLETED | OUTPATIENT
Start: 2021-10-28 | End: 2021-10-28

## 2021-10-28 RX ORDER — MORPHINE SULFATE 4 MG/ML
4 INJECTION, SOLUTION INTRAMUSCULAR; INTRAVENOUS EVERY 4 HOURS PRN
Status: DISCONTINUED | OUTPATIENT
Start: 2021-10-28 | End: 2021-10-28 | Stop reason: HOSPADM

## 2021-10-28 RX ADMIN — ONDANSETRON 4 MG: 2 INJECTION INTRAMUSCULAR; INTRAVENOUS at 16:29

## 2021-10-28 RX ADMIN — MORPHINE SULFATE 4 MG: 4 INJECTION INTRAVENOUS at 16:29

## 2021-10-28 RX ADMIN — DIPHENHYDRAMINE HYDROCHLORIDE 25 MG: 50 INJECTION, SOLUTION INTRAMUSCULAR; INTRAVENOUS at 16:44

## 2021-10-28 RX ADMIN — SODIUM CHLORIDE 1000 ML: 9 INJECTION, SOLUTION INTRAVENOUS at 16:28

## 2021-10-28 ASSESSMENT — PAIN DESCRIPTION - PAIN TYPE
TYPE: SURGICAL PAIN
TYPE: ACUTE PAIN

## 2021-10-28 ASSESSMENT — ENCOUNTER SYMPTOMS
SORE THROAT: 0
NAUSEA: 0
DIARRHEA: 0
SHORTNESS OF BREATH: 0
EYE PAIN: 0
BACK PAIN: 0
CHEST TIGHTNESS: 0

## 2021-10-28 ASSESSMENT — PAIN SCALES - GENERAL
PAINLEVEL_OUTOF10: 7
PAINLEVEL_OUTOF10: 10
PAINLEVEL_OUTOF10: 10

## 2021-10-28 ASSESSMENT — PAIN DESCRIPTION - LOCATION
LOCATION: ABDOMEN
LOCATION: ABDOMEN

## 2021-10-28 ASSESSMENT — PAIN DESCRIPTION - DESCRIPTORS: DESCRIPTORS: CRAMPING;JABBING

## 2021-10-28 NOTE — ED NOTES
Discharge instructions given. Verbalized understanding. Denies further questions or concerns. A&Ox4. Taken to [employer] via wheelchair.       Zechariah Ríos RN  10/28/21 2222

## 2021-10-28 NOTE — ED PROVIDER NOTES
[facility] ED  eMERGENCYdEPARTMENT eNCOUnter      Pt Name: Cortney Bustamante  MRN: [id number]  Aleidagfyusef 1990  Date of evaluation: 10/28/2021  Provider:Giovanni Avila PA-C    CHIEF COMPLAINT           HPI  Cortney Bustamante is a 27 y.o. adult per chart review has a h/o anxiety, sleep apnea, pre-eclampsia presenting with sudden onset, sharp, severe, non radiating abdominal pain at the site of her abdominal incision from  3 days ago. Pt states she has an episode of vomiting a few hours ago that resulted in a \"tearing\" pain at the incision site. Pt also notes some blood clots passed in her urine today. Pt denies fever, chills, chest pain and SOB. ROS  Review of Systems   Constitutional: Negative for chills, fatigue and fever. HENT: Negative for ear pain, hearing loss and sore throat. Eyes: Negative for pain and visual disturbance. Respiratory: Negative for chest tightness and shortness of breath. Cardiovascular: Negative for chest pain. Gastrointestinal: Negative for diarrhea and nausea. Endocrine: Negative for cold intolerance. Genitourinary: Negative for hematuria. Musculoskeletal: Negative for back pain. Skin: Positive for wound. Negative for rash. Neurological: Negative for dizziness and headaches. Psychiatric/Behavioral: Negative for behavioral problems and confusion. Except as noted above the remainder of the review of systems was reviewed and negative.        PAST MEDICAL HISTORY     Past Medical History:   Diagnosis Date    Adrenal insufficiency ([place]'s disease) ([facility] [address]     Anti-phospholipid antibody syndrome ([facility] [facility]     Anxiety 2017    Depression 2017    Diet controlled gestational diabetes mellitus (GDM) in third trimester     Disease of blood and blood forming organ     History of blood transfusion     Pre-eclampsia in third trimester          SURGICAL HISTORY       Past Surgical History:   Procedure Laterality Date     SECTION   SECTION N/A 10/24/2021     SECTION performed by Silverio Eisenmenger, DO at [facility]    GALLBLADDER SURGERY Bilateral 2015    OVARY REMOVAL Left          CURRENTMEDICATIONS       Previous Medications    BLOOD PRESSURE MONITORING (ADULT BLOOD PRESSURE CUFF LG) KIT    1 Container by Does not apply route 3 times daily    BUPROPION (WELLBUTRIN XL) 150 MG EXTENDED RELEASE TABLET    Take 1 tablet by mouth every morning    BUTALBITAL-APAP-CAFFEINE -40 MG CAPS PER CAPSULE    Take 1 capsule by mouth every 4 hours as needed for Headaches    DOXYLAMINE SUCCINATE (GNP SLEEP AID) 25 MG TABLET    Take 1 tablet by mouth nightly    DULOXETINE (CYMBALTA) 20 MG EXTENDED RELEASE CAPSULE    Take 1 capsule by mouth 2 times daily    FAMOTIDINE (PEPCID) 20 MG TABLET    Take 1 tablet by mouth 2 times daily    IBUPROFEN (ADVIL;MOTRIN) 800 MG TABLET    Take 1 tablet by mouth every 8 hours as needed for Pain    OXYCODONE-ACETAMINOPHEN (PERCOCET) 5-325 MG PER TABLET    Take 1 tablet by mouth every 6 hours as needed for Pain for up to 5 days.     PROPRANOLOL (INDERAL) 10 MG TABLET    Take 1 tablet by mouth 3 times daily as needed (anxiety)    QUETIAPINE (SEROQUEL) 50 MG TABLET    Take 1 tablet by mouth every evening       ALLERGIES     Amoxicillin, Erythromycin, and Tequin [gatifloxacin]    FAMILY HISTORY       Family History   Problem Relation Age of Onset    Heart Attack Mother     Heart Attack Father     Breast Cancer Neg Hx           SOCIAL HISTORY       Social History     Socioeconomic History    Marital status: Legally      Spouse name: None    Number of children: None    Years of education: None    Highest education level: None   Occupational History    None   Tobacco Use    Smoking status: Former Smoker     Types: Cigarettes     Quit date: 7/3/2015     Years since quittin.3    Smokeless tobacco: Never Used   Vaping Use    Vaping Use: Never used   Substance and Sexual Activity   

## 2021-11-03 ENCOUNTER — OFFICE VISIT (OUTPATIENT)
Dept: OBGYN CLINIC | Age: 31
End: 2021-11-03

## 2021-11-03 VITALS
DIASTOLIC BLOOD PRESSURE: 70 MMHG | BODY MASS INDEX: 42.68 KG/M2 | HEART RATE: 86 BPM | HEIGHT: 64 IN | WEIGHT: 250 LBS | SYSTOLIC BLOOD PRESSURE: 118 MMHG

## 2021-11-03 DIAGNOSIS — Z98.891 S/P CESAREAN SECTION: ICD-10-CM

## 2021-11-03 DIAGNOSIS — Z48.89 ENCOUNTER FOR POSTOPERATIVE WOUND CHECK: Primary | ICD-10-CM

## 2021-11-03 PROCEDURE — 99024 POSTOP FOLLOW-UP VISIT: CPT | Performed by: OBSTETRICS & GYNECOLOGY

## 2021-11-03 NOTE — PROGRESS NOTES
SUBJECTIVE:   Pt is 2 wks post partum and  here for follow up to delivery. Pt bottle/breast feeding without difficulty. Pt with normal postpartum irregular VB since delivery and no complaints. Denies any depression and pain is well controlled. Doing well back on lovenox    Review of Systems:  General ROS: negative  Psychological ROS: negative  ENT ROS: negative  Endocrine ROS: negative  Respiratory ROS: no cough, shortness of breath, or wheezing  Cardiovascular ROS: no chest pain or dyspnea on exertion  Gastrointestinal ROS: no abdominal pain, change in bowel habits, or black or bloody stools  Genito-Urinary ROS: no dysuria, trouble voiding, or hematuria  Musculoskeletal ROS: negative  Neurological ROS: no TIA or stroke symptoms  Dermatological ROS: negative    OBJECTIVE:   /70   Pulse 86   Ht 5' 4\" (1.626 m)   Wt 250 lb (113.4 kg)   LMP 01/27/2021 (Exact Date)   BMI 42.91 kg/m²     Physical Exam:  GEN: She appears well, afebrile. HEENT: normal cephalic, atraumatic  CVS: regular rate and rhythm  ABDOMEN: benign, soft, nontender, no masses. Incision clean dry and intact  MUSCULOSKELETAL: normal gait, no masses  SKIN: normal texture and tone, no lesions  NEURO: normal tone, no hyperreflexia, 1+DTRs throughout    Pelvic Exam:   Deferred at this time    ASSESSMENT:   Routine Postpartum follow up      PLAN:     Past medical, social and family history reviewed and updated in pt's chart. Post partum care reviewed with patient. Pt adjusting well to infant and denies any depressions sx. Risks, benefits and alternative therapies for metrorrhagia discussed.   Post partum exam in 4 weeks

## 2021-11-04 RX ORDER — BUTALBITAL, ACETAMINOPHEN AND CAFFEINE 300; 40; 50 MG/1; MG/1; MG/1
CAPSULE ORAL
Qty: 48 CAPSULE | Refills: 0 | OUTPATIENT
Start: 2021-11-04

## 2021-11-16 ENCOUNTER — PATIENT MESSAGE (OUTPATIENT)
Dept: BEHAVIORAL/MENTAL HEALTH CLINIC | Age: 31
End: 2021-11-16

## 2021-11-17 NOTE — TELEPHONE ENCOUNTER
From: Glendy Shermanelor  To: Dr. Lenna Crigler: 11/16/2021 7:01 PM EST  Subject: Non-Urgent Medical Question    Hello, can you please provide the phone number to make an appointment?

## 2021-12-08 ENCOUNTER — OFFICE VISIT (OUTPATIENT)
Dept: OBGYN CLINIC | Age: 31
End: 2021-12-08

## 2021-12-08 VITALS
HEIGHT: 64 IN | SYSTOLIC BLOOD PRESSURE: 122 MMHG | DIASTOLIC BLOOD PRESSURE: 76 MMHG | HEART RATE: 86 BPM | WEIGHT: 244 LBS | BODY MASS INDEX: 41.66 KG/M2

## 2021-12-08 DIAGNOSIS — Z98.891 STATUS POST CESAREAN SECTION ROUTINE POSTPARTUM FOLLOW-UP: Primary | ICD-10-CM

## 2021-12-08 PROCEDURE — 0503F POSTPARTUM CARE VISIT: CPT | Performed by: OBSTETRICS & GYNECOLOGY

## 2021-12-08 RX ORDER — ACETAMINOPHEN AND CODEINE PHOSPHATE 120; 12 MG/5ML; MG/5ML
1 SOLUTION ORAL DAILY
Qty: 30 TABLET | Refills: 11 | Status: SHIPPED | OUTPATIENT
Start: 2021-12-08

## 2021-12-09 NOTE — PROGRESS NOTES
SUBJECTIVE:   27 y.o. [id number] here for post partum exam. Pt bottle feeding without difficulty. Pt with irregular VB since delivery and no complaints. Review of Systems:  General ROS: negative  Psychological ROS: negative  ENT ROS: negative  Endocrine ROS: negative  Respiratory ROS: no cough, shortness of breath, or wheezing  Cardiovascular ROS: no chest pain or dyspnea on exertion  Gastrointestinal ROS: no abdominal pain, change in bowel habits, or black or bloody stools  Genito-Urinary ROS: no dysuria, trouble voiding, or hematuria  Musculoskeletal ROS: negative  Neurological ROS: no TIA or stroke symptoms  Dermatological ROS: negative    OBJECTIVE:   /76   Pulse 86   Ht 5' 4\" (1.626 m)   Wt 244 lb (110.7 kg)   LMP 01/27/2021 (Exact Date)   Breastfeeding Yes   BMI 41.88 kg/m²     Physical Exam:  GEN: She appears well, afebrile. HEENT: normal cephalic, atraumatic  CVS: regular rate and rhythm  ABDOMEN: benign, soft, nontender, no masses. MUSCULOSKELETAL: normal gait, no masses  SKIN: normal texture and tone, no lesions  NEURO: normal tone, no hyperreflexia, 1+DTRs throughout    Pelvic Exam:   EFG: normal external genitalia  URETHRA: normal appearing without diverticula or lesions  VULVA: normal appearing vulva with no masses, tenderness or lesions  VAGINA: normal rugae, no discharge   CERVIX: parous, no lesions  UTERUS: uterus is normal size, shape, consistency and nontender   ADNEXA: normal adnexa in size, nontender and no masses. PERINEUM: normal appearing without lesions or masses, well healed  ANUS: normal appearing without lesions or masses, no fissures or hemorrhoids    ASSESSMENT:   Post partum care and exam    PLAN:   LPS  Past medical, social and family history reviewed and updated in pt's chart. Post partum care reviewed with patient. Pt adjusting well to infant and denies any depressions sx. Risks, benefits and alternative therapies for metrorrhagia discussed. Pt desires ocs. Samples/Rx given.

## 2022-09-06 NOTE — PROGRESS NOTES
"Derik"Samanta Mccoy was seen and treated in our emergency department on 9/6/2022.  He may return to work on 09/07/2022.  Please excuse from work 9-5 and 9-6.     If you have any questions or concerns, please don't hesitate to call.      Gary Venegas MD" 11/24/2020    TELEHEALTH PSYCHIATRY FOLLOWUP -- Audio/Visual (During MHYFQ-91 public health emergency)      Psychiatric Diagnoses:  1. Moderate episode of recurrent major depressive disorder ([address]    2. FELIZ (generalized anxiety disorder)          Due to COVID 23 outbreak, patient's office visit was converted to a virtual visit. Patient was contacted and agreed to proceed with a virtual visit via DOXY  30 minutes with direct communication with patient for encounter The risks and benefits of converting to a virtual visit were discussed in light of the current infectious disease epidemic. Patient also understood that insurance coverage and co-pays are up to their individual insurance plans. Patient Location:        Patient's home address  Provider Location (City/State):        [facility]        Assessment/Plan:   INCREASE medications for sleep   Mood reportedly improved, patient has made some \"big\" decisions recently but seems to not have been \"impulsive\" and she is future oriented, denies other symptoms of jason apart from decreased sleep, which she says is due to needing to get her own place/circumstances   Otherwise doing well, will restart seroquel for sleep, has tolerated in the past. No other med adjustments at this time. Medical Diagnoses:  Patient Active Problem List   Diagnosis    Allergy status to other drugs, medicaments and biological substances status    Allergy status to penicillin    Antiphospholipid syndrome (HCC)    Anxiety    Hypothyroidism, unspecified    Mood disorder (HCC)    Morbid obesity (HCC)    Myelofibrosis (HCC)    Other visual disturbances    Personal history of nicotine dependence    Other adrenal hypofunction    Insomnia    Sleep apnea    Moderate episode of recurrent major depressive disorder ([facility] [address]    FELIZ (generalized anxiety disorder)           DATE and changes made  Celexa (for 5 months) and Vistaril)  · 8/31  ?  Decreased Celexa from 40 mg QD to 20 mg QD ? Stopped omeprazole   ? STARTED Wellbutrin  mg QD   · 9/14/20  ? Worsening depressed mood   ? Celexa 20 mg QD will decrease to 5 mg for 7 days then off  ? START low dose of Cymbalta today 20 mg QD   ? Explained risk of serotonin syndrome, patient is aware of risk agrees to go to ER with any symptoms of confusion, muscle rigidity or tremor or fevers  · 9/21/20  ? Depression improvement   ? Celexa taper is going to be complete today or tomorrow pt states   ? Continue wellbutrin   ? START Propranolol 10 mg TID PRN anxiety   ? GENESite   · 10/12  ? Review gene sight - ultrarapid 2d6   ? CYMBALTA WILL INCREASE TO 20 mg BID (may need TID)   § Per gene sight, patient is ultrarapid 2d6 metabolizer so will likely require higher dose of cymbalta if this is to be effective   ? Second option would be to try vilazodone, which had no interactions   ? CONT Wellbutrin  mg (no gene interactions)   ? Cont celexa 5 mg QD - has had difficulty stopping altogether   ? INCREASE propranolol to 20 mg TID PRN anxiety   § Has tolerated 10 mg, also will have rapid metabolism per genesight results 2d6 so may need higher or more frequent doses   · 10/27  ? Continue Cymbalta   § 20 mg BID   ? Celexa   § 5 mg QD   ? Wellbutrin XL  § 150 mg QD  ? Propranolol PRN, Vistaril PRN  · 11/24  ? Increase HYDROXYZINE 50 mg Q6HR for anxiety/sleep   ? START SEROQUEL 25 mg QHS     ? Continue Cymbalta   § 20 mg BID   ? Celexa   § 5 mg QD   ? Wellbutrin XL  § 150 mg QD  ? Propranolol PRN, Vistaril PRN       AT TODAY'S VISIT     1. No Labs ordered today   2. Crisis plan reviewed and patient verbally contracts for safety. Go to ED with emergent symptoms or safety concerns. 3. Risks, benefits, side effects of medications, including any / all black box warnings, discussed with patient, who verbalizes their understanding. Pt is deisi for safety and denies thoughts of SI/HI. Amenable to plan.  No acute concerns to address regarding medications. Subjective:      Pt reports \"I made the decision to split from my  and I just had an \"   Patient says that she feels more able to look at things in her life in a more calculated, assertive way, and then also found out she was pregnant and says \"It just wasn't the right time\"   Patient says she started a new job yesterday   Says she feels good about making this decision (to divorce) \"its the first time luciana made a decision for myself in a long time\"  Not sleeping well   Denies feeling depressed, denies anxious feelings   Hopeful about the future   Has been sleeping no more than 3 hours a night at night, says she is tired during the day     Denies increased goal directed activity, denies impulsivity, changes in appetite or weight, changes in concentration, racing thoughts, irritability, or expansive or elevated mood    Patient reports they have been compliant with current medication regimen and have not missed a dose. Patient denies medication side effects. At today's visit, patient denies thoughts of harm to self or others since last appointment, and denies auditory or visual hallucinations.      At today's visit, pt reports that since our last visit, their average MOOD has been (on a scale of 1-10, with 1 being severely depressed and 10 being not depressed at all)  Very depressed [] 1  [] 2  [] 3  [] 4  [] 5  [x] 6  [] 7  [] 8  [] 9  [] 10  Not depressed    At today's visit, pt reports that since our last visit, their average ANXIETY has been (on a scale of 1-10, with 10 being severely anxious and 1 being not anxious at all)  Not anxious [] 1     [] 2     [x] 3     [] 4   [] 5    [] 6      [] 7   [] 8   [] 9       [] 10  Very anxious       At today's visit, pt reports that since our last visit, their average APPETITE has been    [] Decreased     [x] Normal/Unchanged   [] Increased      At today's visit, pt reports that since our last visit, their average HOURS OF SLEEP (every 24 hours) has been    [x] 0-3   [] 4-5    [] 5-6    [] 6-7    [] 8-9    [] 10-11   [] 11+    At today's visit, pt reports that since our last visit, their average Energy has been     [x] Poor    [] Average  [] Increased          Aggression:  [] yes  [x] no    Patient is [x] Able to contract for safety  [] unable to CONTRACT FOR SAFETY     ROS:  [x] All negative/unchanged except if checked. Explain positive(checked items) below:       [] Constitutional  [] Eyes  [] Ear/Nose/Mouth/Throat  [] Respiratory  [] CV  [] GI  []   [] Musculoskeletal  [] Skin/Breast  [] Neurological  [] Endocrine  [] Heme/Lymph  [] Allergic/Immunologic      MEDICATIONS:    Current Outpatient Medications:     QUEtiapine (SEROQUEL) 25 MG tablet, Take 1 tablet by mouth nightly, Disp: 30 tablet, Rfl: 3    hydrOXYzine (ATARAX) 50 MG tablet, Take 1 tablet by mouth every 6 hours as needed for Anxiety (sleep), Disp: 120 tablet, Rfl: 2    DULoxetine (CYMBALTA) 20 MG extended release capsule, Take 1 capsule by mouth 2 times daily, Disp: 60 capsule, Rfl: 3    propranolol (INDERAL) 20 MG tablet, Take 1 tablet by mouth 3 times daily as needed (anxiety), Disp: 90 tablet, Rfl: 2    citalopram (CELEXA) 10 MG tablet, Take 0.5 tablets by mouth daily, Disp: 15 tablet, Rfl: 1    buPROPion (WELLBUTRIN XL) 150 MG extended release tablet, Take 1 tablet by mouth every morning, Disp: 30 tablet, Rfl: 3    famotidine (PEPCID) 20 MG tablet, Take 1 tablet by mouth 2 times daily, Disp: 60 tablet, Rfl: 3    hydrOXYzine (VISTARIL) 25 MG capsule, take one capsule by mouth 4 times daily as needed for anxiety, Disp: , Rfl:     Examination:    Vitals: not taken in person, most recent vitals in chart reviewed  There were no vitals filed for this visit.     Wt Readings from Last 3 Encounters:   09/24/20 270 lb (122.5 kg)   06/24/20 266 lb (120.7 kg)   06/05/20 270 lb (122.5 kg)       Labs:   no recent labs    Mental Status Examination:    Level of consciousness:  alert and oriented to person, place, and situation  Appearance:  well-appearing good grooming and good hygiene  Behavior/Motor:  no abnormalities noted  Attitude toward examiner:  attentive and good eye contact  Speech:  spontaneous, normal rate and normal volume   Mood: \"better\"  Affect:  mood congruent  Thought processes:  linear and logical  Thought content:  Denies suicidal or homicidal ideation, denies auditory or visual hallucinations  Cognition:  no deficits in attention, concentration notable, recent memory grossly intact  Concentration intact  Memory intact  Insight good   Judgement fair   Fund of Knowledge adequate    Treatment Plan:  Reviewed current Medications with the patient. Education provided on the compliance with treatment. Reviewed OARRs, no concerns identified     The anticipated benefits and side effects of the medications, including the anticipated results of not receiving the medication, and of alternatives to the medications were explained to the patient and their informed consent was obtained for starting medications as well as adjusting the doses (titration or tapering) as indicated. The above information was given by physician in verbal form and sufficient understanding was in evidence. The patient participated in discussion of the information and question and/or concerns were addressed before the medication was given. PSYCHOTHERAPY/COUNSELING:  Encourage patient to attend outpatient appointments and therapy. [x] Therapeutic interview  [] Supportive  [x] CBT  [x] Ongoing  [] Other    No follow-ups on file. Follow-up in 2 weeks, patient informed to call for follow-up    Please note this report has been partially produced using speech recognition software  And may cause contain errors related to that system including grammar, punctuation and spelling as well as words and phrases that may seem inappropriate.  If there are questions or concerns please feel free to contact me to

## (undated) DEVICE — SUTURE VCRL SZ 1 L36IN ABSRB UD L36MM CT-1 1/2 CIR J947H

## (undated) DEVICE — ISLAND DRESSING 2IN X 3IN: Brand: SILVERLON ANTIMICROBIAL WOUND DRESSING

## (undated) DEVICE — STERILE NEOPRENE POWDER-FREE SURGICAL GLOVES WITH NITRILE COATING: Brand: PROTEXIS

## (undated) DEVICE — DRESSING TELFA STRL 3X6

## (undated) DEVICE — STANDARD SURGICAL GOWN, L: Brand: CONVERTORS